# Patient Record
Sex: MALE | Race: BLACK OR AFRICAN AMERICAN | NOT HISPANIC OR LATINO | URBAN - METROPOLITAN AREA
[De-identification: names, ages, dates, MRNs, and addresses within clinical notes are randomized per-mention and may not be internally consistent; named-entity substitution may affect disease eponyms.]

---

## 2023-10-21 ENCOUNTER — INPATIENT (INPATIENT)
Facility: HOSPITAL | Age: 48
LOS: 5 days | Discharge: HOME | End: 2023-10-27
Attending: HOSPITALIST | Admitting: HOSPITALIST
Payer: COMMERCIAL

## 2023-10-21 VITALS
WEIGHT: 315 LBS | RESPIRATION RATE: 18 BRPM | SYSTOLIC BLOOD PRESSURE: 162 MMHG | TEMPERATURE: 102 F | DIASTOLIC BLOOD PRESSURE: 85 MMHG | OXYGEN SATURATION: 98 % | HEIGHT: 73 IN | HEART RATE: 135 BPM

## 2023-10-21 LAB
ALBUMIN SERPL ELPH-MCNC: 3.5 G/DL — SIGNIFICANT CHANGE UP (ref 3.3–5)
ALBUMIN SERPL ELPH-MCNC: 3.5 G/DL — SIGNIFICANT CHANGE UP (ref 3.3–5)
ALP SERPL-CCNC: 75 U/L — SIGNIFICANT CHANGE UP (ref 40–120)
ALP SERPL-CCNC: 75 U/L — SIGNIFICANT CHANGE UP (ref 40–120)
ALT FLD-CCNC: 45 U/L — SIGNIFICANT CHANGE UP (ref 12–78)
ALT FLD-CCNC: 45 U/L — SIGNIFICANT CHANGE UP (ref 12–78)
ANION GAP SERPL CALC-SCNC: 8 MMOL/L — SIGNIFICANT CHANGE UP (ref 5–17)
ANION GAP SERPL CALC-SCNC: 8 MMOL/L — SIGNIFICANT CHANGE UP (ref 5–17)
APPEARANCE UR: CLEAR — SIGNIFICANT CHANGE UP
APPEARANCE UR: CLEAR — SIGNIFICANT CHANGE UP
APTT BLD: 28.2 SEC — SIGNIFICANT CHANGE UP (ref 24.5–35.6)
APTT BLD: 28.2 SEC — SIGNIFICANT CHANGE UP (ref 24.5–35.6)
AST SERPL-CCNC: 17 U/L — SIGNIFICANT CHANGE UP (ref 15–37)
AST SERPL-CCNC: 17 U/L — SIGNIFICANT CHANGE UP (ref 15–37)
BASOPHILS # BLD AUTO: 0.04 K/UL — SIGNIFICANT CHANGE UP (ref 0–0.2)
BASOPHILS # BLD AUTO: 0.04 K/UL — SIGNIFICANT CHANGE UP (ref 0–0.2)
BASOPHILS NFR BLD AUTO: 0.2 % — SIGNIFICANT CHANGE UP (ref 0–2)
BASOPHILS NFR BLD AUTO: 0.2 % — SIGNIFICANT CHANGE UP (ref 0–2)
BILIRUB SERPL-MCNC: 0.8 MG/DL — SIGNIFICANT CHANGE UP (ref 0.2–1.2)
BILIRUB SERPL-MCNC: 0.8 MG/DL — SIGNIFICANT CHANGE UP (ref 0.2–1.2)
BILIRUB UR-MCNC: NEGATIVE — SIGNIFICANT CHANGE UP
BILIRUB UR-MCNC: NEGATIVE — SIGNIFICANT CHANGE UP
BUN SERPL-MCNC: 13 MG/DL — SIGNIFICANT CHANGE UP (ref 7–23)
BUN SERPL-MCNC: 13 MG/DL — SIGNIFICANT CHANGE UP (ref 7–23)
CALCIUM SERPL-MCNC: 8.7 MG/DL — SIGNIFICANT CHANGE UP (ref 8.5–10.1)
CALCIUM SERPL-MCNC: 8.7 MG/DL — SIGNIFICANT CHANGE UP (ref 8.5–10.1)
CHLORIDE SERPL-SCNC: 105 MMOL/L — SIGNIFICANT CHANGE UP (ref 96–108)
CHLORIDE SERPL-SCNC: 105 MMOL/L — SIGNIFICANT CHANGE UP (ref 96–108)
CK SERPL-CCNC: 166 U/L — SIGNIFICANT CHANGE UP (ref 26–308)
CK SERPL-CCNC: 166 U/L — SIGNIFICANT CHANGE UP (ref 26–308)
CO2 SERPL-SCNC: 27 MMOL/L — SIGNIFICANT CHANGE UP (ref 22–31)
CO2 SERPL-SCNC: 27 MMOL/L — SIGNIFICANT CHANGE UP (ref 22–31)
COLOR SPEC: YELLOW — SIGNIFICANT CHANGE UP
COLOR SPEC: YELLOW — SIGNIFICANT CHANGE UP
CREAT SERPL-MCNC: 0.98 MG/DL — SIGNIFICANT CHANGE UP (ref 0.5–1.3)
CREAT SERPL-MCNC: 0.98 MG/DL — SIGNIFICANT CHANGE UP (ref 0.5–1.3)
D DIMER BLD IA.RAPID-MCNC: 169 NG/ML DDU — SIGNIFICANT CHANGE UP
D DIMER BLD IA.RAPID-MCNC: 169 NG/ML DDU — SIGNIFICANT CHANGE UP
DIFF PNL FLD: NEGATIVE — SIGNIFICANT CHANGE UP
DIFF PNL FLD: NEGATIVE — SIGNIFICANT CHANGE UP
EGFR: 94 ML/MIN/1.73M2 — SIGNIFICANT CHANGE UP
EGFR: 94 ML/MIN/1.73M2 — SIGNIFICANT CHANGE UP
EOSINOPHIL # BLD AUTO: 0.02 K/UL — SIGNIFICANT CHANGE UP (ref 0–0.5)
EOSINOPHIL # BLD AUTO: 0.02 K/UL — SIGNIFICANT CHANGE UP (ref 0–0.5)
EOSINOPHIL NFR BLD AUTO: 0.1 % — SIGNIFICANT CHANGE UP (ref 0–6)
EOSINOPHIL NFR BLD AUTO: 0.1 % — SIGNIFICANT CHANGE UP (ref 0–6)
GLUCOSE SERPL-MCNC: 316 MG/DL — HIGH (ref 70–99)
GLUCOSE SERPL-MCNC: 316 MG/DL — HIGH (ref 70–99)
GLUCOSE UR QL: 1000 MG/DL
GLUCOSE UR QL: 1000 MG/DL
HCT VFR BLD CALC: 42.6 % — SIGNIFICANT CHANGE UP (ref 39–50)
HCT VFR BLD CALC: 42.6 % — SIGNIFICANT CHANGE UP (ref 39–50)
HGB BLD-MCNC: 13.3 G/DL — SIGNIFICANT CHANGE UP (ref 13–17)
HGB BLD-MCNC: 13.3 G/DL — SIGNIFICANT CHANGE UP (ref 13–17)
IMM GRANULOCYTES NFR BLD AUTO: 0.6 % — SIGNIFICANT CHANGE UP (ref 0–0.9)
IMM GRANULOCYTES NFR BLD AUTO: 0.6 % — SIGNIFICANT CHANGE UP (ref 0–0.9)
INR BLD: 1.01 RATIO — SIGNIFICANT CHANGE UP (ref 0.85–1.18)
INR BLD: 1.01 RATIO — SIGNIFICANT CHANGE UP (ref 0.85–1.18)
KETONES UR-MCNC: ABNORMAL
KETONES UR-MCNC: ABNORMAL
LACTATE SERPL-SCNC: 2.5 MMOL/L — HIGH (ref 0.7–2)
LACTATE SERPL-SCNC: 2.5 MMOL/L — HIGH (ref 0.7–2)
LACTATE SERPL-SCNC: 2.8 MMOL/L — HIGH (ref 0.7–2)
LACTATE SERPL-SCNC: 2.8 MMOL/L — HIGH (ref 0.7–2)
LEUKOCYTE ESTERASE UR-ACNC: NEGATIVE — SIGNIFICANT CHANGE UP
LEUKOCYTE ESTERASE UR-ACNC: NEGATIVE — SIGNIFICANT CHANGE UP
LYMPHOCYTES # BLD AUTO: 0.48 K/UL — LOW (ref 1–3.3)
LYMPHOCYTES # BLD AUTO: 0.48 K/UL — LOW (ref 1–3.3)
LYMPHOCYTES # BLD AUTO: 2.3 % — LOW (ref 13–44)
LYMPHOCYTES # BLD AUTO: 2.3 % — LOW (ref 13–44)
MCHC RBC-ENTMCNC: 26.4 PG — LOW (ref 27–34)
MCHC RBC-ENTMCNC: 26.4 PG — LOW (ref 27–34)
MCHC RBC-ENTMCNC: 31.2 G/DL — LOW (ref 32–36)
MCHC RBC-ENTMCNC: 31.2 G/DL — LOW (ref 32–36)
MCV RBC AUTO: 84.5 FL — SIGNIFICANT CHANGE UP (ref 80–100)
MCV RBC AUTO: 84.5 FL — SIGNIFICANT CHANGE UP (ref 80–100)
MONOCYTES # BLD AUTO: 1.13 K/UL — HIGH (ref 0–0.9)
MONOCYTES # BLD AUTO: 1.13 K/UL — HIGH (ref 0–0.9)
MONOCYTES NFR BLD AUTO: 5.4 % — SIGNIFICANT CHANGE UP (ref 2–14)
MONOCYTES NFR BLD AUTO: 5.4 % — SIGNIFICANT CHANGE UP (ref 2–14)
NEUTROPHILS # BLD AUTO: 19.31 K/UL — HIGH (ref 1.8–7.4)
NEUTROPHILS # BLD AUTO: 19.31 K/UL — HIGH (ref 1.8–7.4)
NEUTROPHILS NFR BLD AUTO: 91.4 % — HIGH (ref 43–77)
NEUTROPHILS NFR BLD AUTO: 91.4 % — HIGH (ref 43–77)
NITRITE UR-MCNC: NEGATIVE — SIGNIFICANT CHANGE UP
NITRITE UR-MCNC: NEGATIVE — SIGNIFICANT CHANGE UP
NRBC # BLD: 0 /100 WBCS — SIGNIFICANT CHANGE UP (ref 0–0)
NRBC # BLD: 0 /100 WBCS — SIGNIFICANT CHANGE UP (ref 0–0)
NT-PROBNP SERPL-SCNC: 29 PG/ML — SIGNIFICANT CHANGE UP (ref 0–125)
NT-PROBNP SERPL-SCNC: 29 PG/ML — SIGNIFICANT CHANGE UP (ref 0–125)
PH UR: 5 — SIGNIFICANT CHANGE UP (ref 5–8)
PH UR: 5 — SIGNIFICANT CHANGE UP (ref 5–8)
PLATELET # BLD AUTO: 290 K/UL — SIGNIFICANT CHANGE UP (ref 150–400)
PLATELET # BLD AUTO: 290 K/UL — SIGNIFICANT CHANGE UP (ref 150–400)
POTASSIUM SERPL-MCNC: 3.7 MMOL/L — SIGNIFICANT CHANGE UP (ref 3.5–5.3)
POTASSIUM SERPL-MCNC: 3.7 MMOL/L — SIGNIFICANT CHANGE UP (ref 3.5–5.3)
POTASSIUM SERPL-SCNC: 3.7 MMOL/L — SIGNIFICANT CHANGE UP (ref 3.5–5.3)
POTASSIUM SERPL-SCNC: 3.7 MMOL/L — SIGNIFICANT CHANGE UP (ref 3.5–5.3)
PROT SERPL-MCNC: 8.1 GM/DL — SIGNIFICANT CHANGE UP (ref 6–8.3)
PROT SERPL-MCNC: 8.1 GM/DL — SIGNIFICANT CHANGE UP (ref 6–8.3)
PROT UR-MCNC: 15 MG/DL
PROT UR-MCNC: 15 MG/DL
PROTHROM AB SERPL-ACNC: 12.1 SEC — SIGNIFICANT CHANGE UP (ref 9.5–13)
PROTHROM AB SERPL-ACNC: 12.1 SEC — SIGNIFICANT CHANGE UP (ref 9.5–13)
RAPID RVP RESULT: SIGNIFICANT CHANGE UP
RAPID RVP RESULT: SIGNIFICANT CHANGE UP
RBC # BLD: 5.04 M/UL — SIGNIFICANT CHANGE UP (ref 4.2–5.8)
RBC # BLD: 5.04 M/UL — SIGNIFICANT CHANGE UP (ref 4.2–5.8)
RBC # FLD: 13.6 % — SIGNIFICANT CHANGE UP (ref 10.3–14.5)
RBC # FLD: 13.6 % — SIGNIFICANT CHANGE UP (ref 10.3–14.5)
RBC CASTS # UR COMP ASSIST: SIGNIFICANT CHANGE UP /HPF (ref 0–4)
RBC CASTS # UR COMP ASSIST: SIGNIFICANT CHANGE UP /HPF (ref 0–4)
SARS-COV-2 RNA SPEC QL NAA+PROBE: SIGNIFICANT CHANGE UP
SARS-COV-2 RNA SPEC QL NAA+PROBE: SIGNIFICANT CHANGE UP
SODIUM SERPL-SCNC: 140 MMOL/L — SIGNIFICANT CHANGE UP (ref 135–145)
SODIUM SERPL-SCNC: 140 MMOL/L — SIGNIFICANT CHANGE UP (ref 135–145)
SP GR SPEC: 1.02 — SIGNIFICANT CHANGE UP (ref 1.01–1.02)
SP GR SPEC: 1.02 — SIGNIFICANT CHANGE UP (ref 1.01–1.02)
TROPONIN I, HIGH SENSITIVITY RESULT: 7 NG/L — SIGNIFICANT CHANGE UP
TROPONIN I, HIGH SENSITIVITY RESULT: 7 NG/L — SIGNIFICANT CHANGE UP
UROBILINOGEN FLD QL: NEGATIVE MG/DL — SIGNIFICANT CHANGE UP
UROBILINOGEN FLD QL: NEGATIVE MG/DL — SIGNIFICANT CHANGE UP
WBC # BLD: 21.11 K/UL — HIGH (ref 3.8–10.5)
WBC # BLD: 21.11 K/UL — HIGH (ref 3.8–10.5)
WBC # FLD AUTO: 21.11 K/UL — HIGH (ref 3.8–10.5)
WBC # FLD AUTO: 21.11 K/UL — HIGH (ref 3.8–10.5)
WBC UR QL: SIGNIFICANT CHANGE UP
WBC UR QL: SIGNIFICANT CHANGE UP

## 2023-10-21 PROCEDURE — 93010 ELECTROCARDIOGRAM REPORT: CPT

## 2023-10-21 PROCEDURE — 74177 CT ABD & PELVIS W/CONTRAST: CPT | Mod: 26,MA

## 2023-10-21 PROCEDURE — 71045 X-RAY EXAM CHEST 1 VIEW: CPT | Mod: 26

## 2023-10-21 PROCEDURE — 71260 CT THORAX DX C+: CPT | Mod: 26,MA

## 2023-10-21 PROCEDURE — 99285 EMERGENCY DEPT VISIT HI MDM: CPT

## 2023-10-21 RX ORDER — ACETAMINOPHEN 500 MG
1000 TABLET ORAL ONCE
Refills: 0 | Status: COMPLETED | OUTPATIENT
Start: 2023-10-21 | End: 2023-10-21

## 2023-10-21 RX ORDER — AZITHROMYCIN 500 MG/1
500 TABLET, FILM COATED ORAL ONCE
Refills: 0 | Status: COMPLETED | OUTPATIENT
Start: 2023-10-21 | End: 2023-10-21

## 2023-10-21 RX ORDER — SODIUM CHLORIDE 9 MG/ML
2500 INJECTION INTRAMUSCULAR; INTRAVENOUS; SUBCUTANEOUS ONCE
Refills: 0 | Status: COMPLETED | OUTPATIENT
Start: 2023-10-21 | End: 2023-10-21

## 2023-10-21 RX ORDER — CEFTRIAXONE 500 MG/1
1000 INJECTION, POWDER, FOR SOLUTION INTRAMUSCULAR; INTRAVENOUS ONCE
Refills: 0 | Status: COMPLETED | OUTPATIENT
Start: 2023-10-21 | End: 2023-10-21

## 2023-10-21 RX ORDER — IBUPROFEN 200 MG
600 TABLET ORAL ONCE
Refills: 0 | Status: COMPLETED | OUTPATIENT
Start: 2023-10-21 | End: 2023-10-21

## 2023-10-21 RX ADMIN — CEFTRIAXONE 100 MILLIGRAM(S): 500 INJECTION, POWDER, FOR SOLUTION INTRAMUSCULAR; INTRAVENOUS at 20:02

## 2023-10-21 RX ADMIN — SODIUM CHLORIDE 2500 MILLILITER(S): 9 INJECTION INTRAMUSCULAR; INTRAVENOUS; SUBCUTANEOUS at 19:45

## 2023-10-21 RX ADMIN — Medication 600 MILLIGRAM(S): at 23:51

## 2023-10-21 RX ADMIN — AZITHROMYCIN 255 MILLIGRAM(S): 500 TABLET, FILM COATED ORAL at 20:46

## 2023-10-21 RX ADMIN — Medication 400 MILLIGRAM(S): at 19:45

## 2023-10-21 RX ADMIN — Medication 100 MILLIGRAM(S): at 19:45

## 2023-10-21 NOTE — ED PROVIDER NOTE - ATTENDING APP SHARED VISIT CONTRIBUTION OF CARE
This patient is a 48 year old man hx of DM and HTN who presents to the ER with reports of generalized weakness, cough, and fever.  Patient states that he was in a good state prior to today however his wife at bedside does reports that around 12 pm he complained of mild back pain.  He did not eat much today and when they went out to eat around 3pm she noticed the patent appeared SOB.  Patient reports that he developed a non-productive cough at that time and was SOB but the SOB has improved.  He denies chest pain, sick contacts, recent travel, dysuria, hematuria, diarrhea, vomiting and URI symptoms.  He reports that he has been feeling weaker since the onset of symptoms and has never had these symptoms in the past.  Patient ill appearing on exam, tachycardic, lungs clear, abdomen soft, PERRL, CN 2-12 intact, no focal or sensory deficits.  Patient noted to be febrile code sepsis alert by me.  IVF and abx ordred  Likely suspected viral infection as well as bacteremia and pyelonephritis.  RVP resulted negative.  CT and UA negative for acute pathology.  Patient continued to appear ill but non-toxic and with generalized weakness reluctant to sit up in bed or stand.  Patient admitted to telemetry. This patient is a 48 year old man hx of DM and HTN who presents to the ER with reports of generalized weakness, cough, and fever.  Patient states that he was in a good state prior to today however his wife at bedside does reports that around 12 pm he complained of mild back pain.  He did not eat much today and when they went out to eat around 3pm she noticed the patent appeared SOB.  Patient reports that he developed a non-productive cough at that time and was SOB but the SOB has improved.  He denies chest pain, sick contacts, recent travel, dysuria, hematuria, diarrhea, vomiting and URI symptoms.  He reports that he has been feeling weaker since the onset of symptoms and has never had these symptoms in the past.  Patient ill appearing on exam, tachycardic, lungs clear, abdomen soft, PERRL, CN 2-12 intact, no focal or sensory deficits.  Patient noted to be febrile code sepsis alert by me.  IVF and abx ordred  Likely suspected viral infection as well as bacteremia and pyelonephritis.  RVP resulted negative.  Leukocytosis noted.  UA negative and CT shows no etiologic cause for sepsis.  Patient continued to appear ill but non-toxic and with generalized weakness reluctant to sit up in bed or stand.  Patient admitted to telemetry. This patient is a 48 year old man hx of DM and HTN who presents to the ER with reports of generalized weakness, cough, and fever.  Patient states that he was in a good state prior to today however his wife at bedside does reports that around 12 pm he complained of mild back pain.  He did not eat much today and when they went out to eat around 3pm she noticed the patent appeared SOB.  Patient reports that he developed a non-productive cough at that time and was SOB but the SOB has improved.  He denies chest pain, sick contacts, recent travel, dysuria, hematuria, diarrhea, vomiting, rash, abscess, sore throat and URI symptoms.  He reports that he has been feeling weaker since the onset of symptoms and has never had these symptoms in the past.  Patient ill appearing on exam, tachycardic, lungs clear, abdomen soft, PERRL, CN 2-12 intact, no focal or sensory deficits.  Patient noted to be febrile code sepsis alert.  IVF and abx ordered.  Likely suspected viral infection as well as bacteremia and pyelonephritis.  RVP resulted negative.  Leukocytosis noted.  UA negative and CT shows no etiologic cause for sepsis.  Patient continued to appear ill but non-toxic and with generalized weakness reluctant to sit up in bed or stand.  Patient admitted to telemetry.

## 2023-10-21 NOTE — ED ADULT NURSE NOTE - OBJECTIVE STATEMENT
Pt AOx4 and ambulatory with steady gait c/o cough, lower back pain, and difficulty breathing since this morning. Pt wife at bedside states she noticed pt was not feeling well since last night. Pt states cough is productive at times with clear sputum. Pt with uncontrolled cough noted. Pt denies N/V/D, HA/dizziness, abdominal pain, or dysuria. PMH HTN, DM.

## 2023-10-21 NOTE — ED PROVIDER NOTE - NS ED ATTENDING STATEMENT MOD
This was a shared visit with the RUBEN. I reviewed and verified the documentation and independently performed the documented:

## 2023-10-21 NOTE — ED PROVIDER NOTE - CLINICAL SUMMARY MEDICAL DECISION MAKING FREE TEXT BOX
49 y/o male with dm, htn here with coughing and fever today. Vs reviewed, pt is febrile 102 and tachycardic. Ddx include but not limited to viral illness, PNA.   Sepsis labs ordered, RVP, cxr r/o PNA, ivf, tylenol and tesssalon perles.

## 2023-10-21 NOTE — ED PROVIDER NOTE - NS ED ROS FT
CONSTITUTIONAL: no fatigue  EYES: No visual changes  ENT: No ear pain, no sore throat  CARDIOVASCULAR: No chest pain, no palpitations  RESPIRATORY:  no SOB  GI: No abdominal pain, no nausea, no vomiting, no constipation, no diarrhea  GENITOURINARY: No dysuria, no frequency, no hematuria  MUSKULOSKELETAL: No backpain, no joint pain, no myalgias  SKIN: No rash  NEURO: No headache    ALL OTHER SYSTEMS NEGATIVE.

## 2023-10-21 NOTE — ED PROVIDER NOTE - PHYSICAL EXAMINATION
GEN: Awake, alert, interactive, NAD. (+) Coughing  HEAD AND NECK: NC/AT. Airway patent. Neck supple.   EYES:  Clear b/l.    ENT: Moist mucus membranes.   CARDIAC: (+) tachycardic  No evident pedal edema.    RESP/CHEST: Normal respiratory effort with no use of accessory muscles or retractions. Clear throughout on auscultation.  ABD: soft, non-distended, non-tender. No rebound, no guarding.   BACK: No midline spinal TTP. No CVAT.   EXTREMITIES: Moving all extremities with no apparent deformities.   SKIN: Warm, dry, intact normal color. No rash.   NEURO: AOx3, CN II-XII grossly intact, no focal deficits.   PSYCH: Appropriate mood and affect.

## 2023-10-21 NOTE — ED ADULT NURSE NOTE - ED STAT RN HANDOFF DETAILS
Hand off report given to Fifi NASCIMENTO. Pt on cardiac monitor and pulse ox, pt in sinus tachycardia -071. Respirations spontaneous and unlabored. Pt in NAD at this time

## 2023-10-21 NOTE — ED PROVIDER NOTE - OBJECTIVE STATEMENT
49 y/o male with DM, htn here with fever and cough started today. Pt reports coughing today with clear phlegm. Pt denies any chest pain, dyspnea, recent travel, sick contact, nausea, vomiting, abdominal pain. Pt denies smoking, drinking drugs.

## 2023-10-21 NOTE — ED ADULT NURSE NOTE - NSFALLUNIVINTERV_ED_ALL_ED
Bed/Stretcher in lowest position, wheels locked, appropriate side rails in place/Call bell, personal items and telephone in reach/Instruct patient to call for assistance before getting out of bed/chair/stretcher/Non-slip footwear applied when patient is off stretcher/[place] to call system/Physically safe environment - no spills, clutter or unnecessary equipment/Purposeful proactive rounding/Room/bathroom lighting operational, light cord in reach

## 2023-10-22 DIAGNOSIS — R53.1 WEAKNESS: ICD-10-CM

## 2023-10-22 DIAGNOSIS — I10 ESSENTIAL (PRIMARY) HYPERTENSION: ICD-10-CM

## 2023-10-22 DIAGNOSIS — L03.90 CELLULITIS, UNSPECIFIED: ICD-10-CM

## 2023-10-22 DIAGNOSIS — E87.20 ACIDOSIS, UNSPECIFIED: ICD-10-CM

## 2023-10-22 DIAGNOSIS — E11.65 TYPE 2 DIABETES MELLITUS WITH HYPERGLYCEMIA: ICD-10-CM

## 2023-10-22 DIAGNOSIS — E78.5 HYPERLIPIDEMIA, UNSPECIFIED: ICD-10-CM

## 2023-10-22 LAB
A1C WITH ESTIMATED AVERAGE GLUCOSE RESULT: 10.6 % — HIGH (ref 4–5.6)
A1C WITH ESTIMATED AVERAGE GLUCOSE RESULT: 10.6 % — HIGH (ref 4–5.6)
ANION GAP SERPL CALC-SCNC: 7 MMOL/L — SIGNIFICANT CHANGE UP (ref 5–17)
ANION GAP SERPL CALC-SCNC: 7 MMOL/L — SIGNIFICANT CHANGE UP (ref 5–17)
BUN SERPL-MCNC: 9 MG/DL — SIGNIFICANT CHANGE UP (ref 7–23)
BUN SERPL-MCNC: 9 MG/DL — SIGNIFICANT CHANGE UP (ref 7–23)
CALCIUM SERPL-MCNC: 8.1 MG/DL — LOW (ref 8.5–10.1)
CALCIUM SERPL-MCNC: 8.1 MG/DL — LOW (ref 8.5–10.1)
CHLORIDE SERPL-SCNC: 111 MMOL/L — HIGH (ref 96–108)
CHLORIDE SERPL-SCNC: 111 MMOL/L — HIGH (ref 96–108)
CO2 SERPL-SCNC: 24 MMOL/L — SIGNIFICANT CHANGE UP (ref 22–31)
CO2 SERPL-SCNC: 24 MMOL/L — SIGNIFICANT CHANGE UP (ref 22–31)
CREAT SERPL-MCNC: 0.69 MG/DL — SIGNIFICANT CHANGE UP (ref 0.5–1.3)
CREAT SERPL-MCNC: 0.69 MG/DL — SIGNIFICANT CHANGE UP (ref 0.5–1.3)
EGFR: 113 ML/MIN/1.73M2 — SIGNIFICANT CHANGE UP
EGFR: 113 ML/MIN/1.73M2 — SIGNIFICANT CHANGE UP
ESTIMATED AVERAGE GLUCOSE: 258 MG/DL — HIGH (ref 68–114)
ESTIMATED AVERAGE GLUCOSE: 258 MG/DL — HIGH (ref 68–114)
GLUCOSE BLDC GLUCOMTR-MCNC: 196 MG/DL — HIGH (ref 70–99)
GLUCOSE BLDC GLUCOMTR-MCNC: 196 MG/DL — HIGH (ref 70–99)
GLUCOSE BLDC GLUCOMTR-MCNC: 215 MG/DL — HIGH (ref 70–99)
GLUCOSE BLDC GLUCOMTR-MCNC: 215 MG/DL — HIGH (ref 70–99)
GLUCOSE BLDC GLUCOMTR-MCNC: 220 MG/DL — HIGH (ref 70–99)
GLUCOSE BLDC GLUCOMTR-MCNC: 220 MG/DL — HIGH (ref 70–99)
GLUCOSE BLDC GLUCOMTR-MCNC: 287 MG/DL — HIGH (ref 70–99)
GLUCOSE BLDC GLUCOMTR-MCNC: 287 MG/DL — HIGH (ref 70–99)
GLUCOSE SERPL-MCNC: 202 MG/DL — HIGH (ref 70–99)
GLUCOSE SERPL-MCNC: 202 MG/DL — HIGH (ref 70–99)
GP B STREP DNA BLD POS QL NAA+NON-PROBE: SIGNIFICANT CHANGE UP
GP B STREP DNA BLD POS QL NAA+NON-PROBE: SIGNIFICANT CHANGE UP
GRAM STN FLD: SIGNIFICANT CHANGE UP
GRAM STN FLD: SIGNIFICANT CHANGE UP
HCT VFR BLD CALC: 37 % — LOW (ref 39–50)
HCT VFR BLD CALC: 37 % — LOW (ref 39–50)
HGB BLD-MCNC: 12.1 G/DL — LOW (ref 13–17)
HGB BLD-MCNC: 12.1 G/DL — LOW (ref 13–17)
LACTATE SERPL-SCNC: 1.3 MMOL/L — SIGNIFICANT CHANGE UP (ref 0.7–2)
LACTATE SERPL-SCNC: 1.3 MMOL/L — SIGNIFICANT CHANGE UP (ref 0.7–2)
MCHC RBC-ENTMCNC: 27.3 PG — SIGNIFICANT CHANGE UP (ref 27–34)
MCHC RBC-ENTMCNC: 27.3 PG — SIGNIFICANT CHANGE UP (ref 27–34)
MCHC RBC-ENTMCNC: 32.7 G/DL — SIGNIFICANT CHANGE UP (ref 32–36)
MCHC RBC-ENTMCNC: 32.7 G/DL — SIGNIFICANT CHANGE UP (ref 32–36)
MCV RBC AUTO: 83.5 FL — SIGNIFICANT CHANGE UP (ref 80–100)
MCV RBC AUTO: 83.5 FL — SIGNIFICANT CHANGE UP (ref 80–100)
METHOD TYPE: SIGNIFICANT CHANGE UP
METHOD TYPE: SIGNIFICANT CHANGE UP
MRSA PCR RESULT.: SIGNIFICANT CHANGE UP
MRSA PCR RESULT.: SIGNIFICANT CHANGE UP
NRBC # BLD: 0 /100 WBCS — SIGNIFICANT CHANGE UP (ref 0–0)
NRBC # BLD: 0 /100 WBCS — SIGNIFICANT CHANGE UP (ref 0–0)
PLATELET # BLD AUTO: 243 K/UL — SIGNIFICANT CHANGE UP (ref 150–400)
PLATELET # BLD AUTO: 243 K/UL — SIGNIFICANT CHANGE UP (ref 150–400)
POTASSIUM SERPL-MCNC: 3.1 MMOL/L — LOW (ref 3.5–5.3)
POTASSIUM SERPL-MCNC: 3.1 MMOL/L — LOW (ref 3.5–5.3)
POTASSIUM SERPL-SCNC: 3.1 MMOL/L — LOW (ref 3.5–5.3)
POTASSIUM SERPL-SCNC: 3.1 MMOL/L — LOW (ref 3.5–5.3)
PROCALCITONIN SERPL-MCNC: 6.02 NG/ML — HIGH (ref 0.02–0.1)
PROCALCITONIN SERPL-MCNC: 6.02 NG/ML — HIGH (ref 0.02–0.1)
RBC # BLD: 4.43 M/UL — SIGNIFICANT CHANGE UP (ref 4.2–5.8)
RBC # BLD: 4.43 M/UL — SIGNIFICANT CHANGE UP (ref 4.2–5.8)
RBC # FLD: 13.8 % — SIGNIFICANT CHANGE UP (ref 10.3–14.5)
RBC # FLD: 13.8 % — SIGNIFICANT CHANGE UP (ref 10.3–14.5)
S AUREUS DNA NOSE QL NAA+PROBE: SIGNIFICANT CHANGE UP
S AUREUS DNA NOSE QL NAA+PROBE: SIGNIFICANT CHANGE UP
SODIUM SERPL-SCNC: 142 MMOL/L — SIGNIFICANT CHANGE UP (ref 135–145)
SODIUM SERPL-SCNC: 142 MMOL/L — SIGNIFICANT CHANGE UP (ref 135–145)
SPECIMEN SOURCE: SIGNIFICANT CHANGE UP
SPECIMEN SOURCE: SIGNIFICANT CHANGE UP
WBC # BLD: 16.12 K/UL — HIGH (ref 3.8–10.5)
WBC # BLD: 16.12 K/UL — HIGH (ref 3.8–10.5)
WBC # FLD AUTO: 16.12 K/UL — HIGH (ref 3.8–10.5)
WBC # FLD AUTO: 16.12 K/UL — HIGH (ref 3.8–10.5)

## 2023-10-22 PROCEDURE — 73701 CT LOWER EXTREMITY W/DYE: CPT | Mod: 26,LT

## 2023-10-22 PROCEDURE — 93970 EXTREMITY STUDY: CPT | Mod: 26

## 2023-10-22 PROCEDURE — 93926 LOWER EXTREMITY STUDY: CPT | Mod: 26,LT

## 2023-10-22 PROCEDURE — 99222 1ST HOSP IP/OBS MODERATE 55: CPT

## 2023-10-22 RX ORDER — CEFAZOLIN SODIUM 1 G
1000 VIAL (EA) INJECTION EVERY 8 HOURS
Refills: 0 | Status: DISCONTINUED | OUTPATIENT
Start: 2023-10-22 | End: 2023-10-22

## 2023-10-22 RX ORDER — SODIUM CHLORIDE 9 MG/ML
1000 INJECTION, SOLUTION INTRAVENOUS
Refills: 0 | Status: DISCONTINUED | OUTPATIENT
Start: 2023-10-22 | End: 2023-10-27

## 2023-10-22 RX ORDER — CEFTRIAXONE 500 MG/1
2000 INJECTION, POWDER, FOR SOLUTION INTRAMUSCULAR; INTRAVENOUS EVERY 24 HOURS
Refills: 0 | Status: DISCONTINUED | OUTPATIENT
Start: 2023-10-22 | End: 2023-10-27

## 2023-10-22 RX ORDER — ASPIRIN/CALCIUM CARB/MAGNESIUM 324 MG
81 TABLET ORAL DAILY
Refills: 0 | Status: DISCONTINUED | OUTPATIENT
Start: 2023-10-22 | End: 2023-10-27

## 2023-10-22 RX ORDER — DEXTROSE 50 % IN WATER 50 %
25 SYRINGE (ML) INTRAVENOUS ONCE
Refills: 0 | Status: DISCONTINUED | OUTPATIENT
Start: 2023-10-22 | End: 2023-10-27

## 2023-10-22 RX ORDER — DEXTROSE 50 % IN WATER 50 %
12.5 SYRINGE (ML) INTRAVENOUS ONCE
Refills: 0 | Status: DISCONTINUED | OUTPATIENT
Start: 2023-10-22 | End: 2023-10-27

## 2023-10-22 RX ORDER — ACETAMINOPHEN 500 MG
1000 TABLET ORAL ONCE
Refills: 0 | Status: COMPLETED | OUTPATIENT
Start: 2023-10-22 | End: 2023-10-22

## 2023-10-22 RX ORDER — NIFEDIPINE 30 MG
1 TABLET, EXTENDED RELEASE 24 HR ORAL
Refills: 0 | DISCHARGE

## 2023-10-22 RX ORDER — GLUCAGON INJECTION, SOLUTION 0.5 MG/.1ML
1 INJECTION, SOLUTION SUBCUTANEOUS ONCE
Refills: 0 | Status: DISCONTINUED | OUTPATIENT
Start: 2023-10-22 | End: 2023-10-27

## 2023-10-22 RX ORDER — CEFAZOLIN SODIUM 1 G
1000 VIAL (EA) INJECTION ONCE
Refills: 0 | Status: COMPLETED | OUTPATIENT
Start: 2023-10-22 | End: 2023-10-22

## 2023-10-22 RX ORDER — NIFEDIPINE 30 MG
60 TABLET, EXTENDED RELEASE 24 HR ORAL DAILY
Refills: 0 | Status: DISCONTINUED | OUTPATIENT
Start: 2023-10-22 | End: 2023-10-27

## 2023-10-22 RX ORDER — ACARBOSE 25 MG/1
1 TABLET ORAL
Refills: 0 | DISCHARGE

## 2023-10-22 RX ORDER — TAMSULOSIN HYDROCHLORIDE 0.4 MG/1
0.4 CAPSULE ORAL AT BEDTIME
Refills: 0 | Status: DISCONTINUED | OUTPATIENT
Start: 2023-10-22 | End: 2023-10-27

## 2023-10-22 RX ORDER — CEFAZOLIN SODIUM 1 G
VIAL (EA) INJECTION
Refills: 0 | Status: DISCONTINUED | OUTPATIENT
Start: 2023-10-22 | End: 2023-10-22

## 2023-10-22 RX ORDER — DULAGLUTIDE 4.5 MG/.5ML
1.5 INJECTION, SOLUTION SUBCUTANEOUS
Refills: 0 | DISCHARGE

## 2023-10-22 RX ORDER — ENOXAPARIN SODIUM 100 MG/ML
40 INJECTION SUBCUTANEOUS EVERY 12 HOURS
Refills: 0 | Status: DISCONTINUED | OUTPATIENT
Start: 2023-10-22 | End: 2023-10-27

## 2023-10-22 RX ORDER — SIMVASTATIN 20 MG/1
1 TABLET, FILM COATED ORAL
Refills: 0 | DISCHARGE

## 2023-10-22 RX ORDER — ACETAMINOPHEN 500 MG
650 TABLET ORAL EVERY 6 HOURS
Refills: 0 | Status: DISCONTINUED | OUTPATIENT
Start: 2023-10-22 | End: 2023-10-27

## 2023-10-22 RX ORDER — DEXTROSE 50 % IN WATER 50 %
15 SYRINGE (ML) INTRAVENOUS ONCE
Refills: 0 | Status: DISCONTINUED | OUTPATIENT
Start: 2023-10-22 | End: 2023-10-27

## 2023-10-22 RX ORDER — ATORVASTATIN CALCIUM 80 MG/1
40 TABLET, FILM COATED ORAL AT BEDTIME
Refills: 0 | Status: DISCONTINUED | OUTPATIENT
Start: 2023-10-22 | End: 2023-10-27

## 2023-10-22 RX ORDER — LANOLIN ALCOHOL/MO/W.PET/CERES
3 CREAM (GRAM) TOPICAL AT BEDTIME
Refills: 0 | Status: DISCONTINUED | OUTPATIENT
Start: 2023-10-22 | End: 2023-10-27

## 2023-10-22 RX ORDER — VANCOMYCIN HCL 1 G
1000 VIAL (EA) INTRAVENOUS ONCE
Refills: 0 | Status: COMPLETED | OUTPATIENT
Start: 2023-10-22 | End: 2023-10-22

## 2023-10-22 RX ORDER — ASPIRIN/CALCIUM CARB/MAGNESIUM 324 MG
1 TABLET ORAL
Refills: 0 | DISCHARGE

## 2023-10-22 RX ORDER — METFORMIN HYDROCHLORIDE 850 MG/1
1 TABLET ORAL
Refills: 0 | DISCHARGE

## 2023-10-22 RX ORDER — SODIUM CHLORIDE 9 MG/ML
1000 INJECTION INTRAMUSCULAR; INTRAVENOUS; SUBCUTANEOUS ONCE
Refills: 0 | Status: COMPLETED | OUTPATIENT
Start: 2023-10-22 | End: 2023-10-22

## 2023-10-22 RX ORDER — INSULIN LISPRO 100/ML
VIAL (ML) SUBCUTANEOUS
Refills: 0 | Status: DISCONTINUED | OUTPATIENT
Start: 2023-10-22 | End: 2023-10-27

## 2023-10-22 RX ORDER — INFLUENZA VIRUS VACCINE 15; 15; 15; 15 UG/.5ML; UG/.5ML; UG/.5ML; UG/.5ML
0.5 SUSPENSION INTRAMUSCULAR ONCE
Refills: 0 | Status: DISCONTINUED | OUTPATIENT
Start: 2023-10-22 | End: 2023-10-27

## 2023-10-22 RX ORDER — ONDANSETRON 8 MG/1
4 TABLET, FILM COATED ORAL EVERY 8 HOURS
Refills: 0 | Status: DISCONTINUED | OUTPATIENT
Start: 2023-10-22 | End: 2023-10-27

## 2023-10-22 RX ORDER — LISINOPRIL 2.5 MG/1
40 TABLET ORAL DAILY
Refills: 0 | Status: DISCONTINUED | OUTPATIENT
Start: 2023-10-22 | End: 2023-10-27

## 2023-10-22 RX ORDER — LISINOPRIL 2.5 MG/1
1 TABLET ORAL
Refills: 0 | DISCHARGE

## 2023-10-22 RX ADMIN — Medication 400 MILLIGRAM(S): at 23:40

## 2023-10-22 RX ADMIN — SODIUM CHLORIDE 1000 MILLILITER(S): 9 INJECTION INTRAMUSCULAR; INTRAVENOUS; SUBCUTANEOUS at 01:09

## 2023-10-22 RX ADMIN — ENOXAPARIN SODIUM 40 MILLIGRAM(S): 100 INJECTION SUBCUTANEOUS at 06:08

## 2023-10-22 RX ADMIN — TAMSULOSIN HYDROCHLORIDE 0.4 MILLIGRAM(S): 0.4 CAPSULE ORAL at 21:53

## 2023-10-22 RX ADMIN — LISINOPRIL 40 MILLIGRAM(S): 2.5 TABLET ORAL at 06:02

## 2023-10-22 RX ADMIN — Medication 110 MILLIGRAM(S): at 06:03

## 2023-10-22 RX ADMIN — Medication 100 MILLIGRAM(S): at 06:02

## 2023-10-22 RX ADMIN — Medication 60 MILLIGRAM(S): at 06:03

## 2023-10-22 RX ADMIN — Medication 100 MILLIGRAM(S): at 12:29

## 2023-10-22 RX ADMIN — Medication 250 MILLIGRAM(S): at 16:09

## 2023-10-22 RX ADMIN — ATORVASTATIN CALCIUM 40 MILLIGRAM(S): 80 TABLET, FILM COATED ORAL at 21:53

## 2023-10-22 RX ADMIN — Medication 650 MILLIGRAM(S): at 12:34

## 2023-10-22 RX ADMIN — CEFTRIAXONE 100 MILLIGRAM(S): 500 INJECTION, POWDER, FOR SOLUTION INTRAMUSCULAR; INTRAVENOUS at 17:13

## 2023-10-22 RX ADMIN — Medication 100 MILLIGRAM(S): at 12:49

## 2023-10-22 RX ADMIN — Medication 100 MILLIGRAM(S): at 21:52

## 2023-10-22 RX ADMIN — ENOXAPARIN SODIUM 40 MILLIGRAM(S): 100 INJECTION SUBCUTANEOUS at 17:17

## 2023-10-22 RX ADMIN — Medication 100 MILLIGRAM(S): at 21:53

## 2023-10-22 RX ADMIN — Medication 100 MILLIGRAM(S): at 13:59

## 2023-10-22 RX ADMIN — Medication 600 MILLIGRAM(S): at 08:04

## 2023-10-22 RX ADMIN — Medication 650 MILLIGRAM(S): at 11:31

## 2023-10-22 RX ADMIN — Medication 81 MILLIGRAM(S): at 11:22

## 2023-10-22 RX ADMIN — TAMSULOSIN HYDROCHLORIDE 0.4 MILLIGRAM(S): 0.4 CAPSULE ORAL at 01:09

## 2023-10-22 NOTE — PATIENT PROFILE ADULT - HAS THE PATIENT RECEIVED THE INFLUENZA VACCINE THIS SEASON?
Routing refill request to provider for review/approval because:  Failing last bp    Radha Chino, RN           no...

## 2023-10-22 NOTE — H&P ADULT - ASSESSMENT
Marshall Harding is a 48 year old male with PMHx of HTN, HLD, and NIDDM2 who presented to the ED on 10/21/23 for complaints of cough and admitted for sepsis secondary to suspected LLE cellulitis and ?encephalitis.    Sepsis secondary to suspected LLE cellulitis  Differential for sepsis also includes possibly ?viral encephalitis  Complaints of cough x 3 days, associated headache  Reports recent travel to [place], admits to mosquito bites while there, denies retro-orbital pain  No meningeal signs such as photophobia, nuchal rigidity, nausea, or vomiting  No altered mental status  Physical exam with LLE erythematous, tender to palpation, warm to touch, LLE slightly larger than RLE  Tmax 103.1, , RR 25, WBC 21.11K on admission  U/A unremarkable for infection, RVP negative  CT A/P without signs of infection  CT chest performed, pending official read - as per ER physician, radiologist informed her it was unremarkable  S/p 3.5L NS bolus, ceftriaxone, and azithromycin in the ED  Started on doxycycline  F/u blood cultures, urine culture, procal, MRSA/MSSA PCR  F/u b/l LE venous doppler to r/o DVT  Monitor fever curve and WBC trend  Telemetry  Consider neurology consult if develops AMS    Lactic acidosis secondary to above  Lactic acid 2.8 on admission  S/p 3.5L NS bolus in the ED  F/u serial lactates    Generalized weakness, suspect due to infectious etiology  PT/OT ordered      Chronic medical conditions:  HTN: PTA lisinopril, nifedipine  HLD: PTA simvastatin  NIDDM2 with hyperglycemia: POC qac and qhs, SSI, PTA metformin, trulicity, acarbose, glipizide held, blood glucose goal < 180, f/u A1c    Plan of care discussed with wife at bedside.

## 2023-10-22 NOTE — PATIENT PROFILE ADULT - FALL HARM RISK - RISK INTERVENTIONS

## 2023-10-22 NOTE — H&P ADULT - NSHPPHYSICALEXAM_GEN_ALL_CORE
T(C): 37.9 (10-22-23 @ 02:09), Max: 39.5 (10-21-23 @ 20:51)  HR: 112 (10-22-23 @ 02:09) (112 - 138)  BP: 168/76 (10-22-23 @ 02:09) (139/88 - 181/69)  RR: 25 (10-22-23 @ 02:09) (18 - 25)  SpO2: 94% (10-22-23 @ 02:09) (94% - 98%)    CONSTITUTIONAL: Well groomed, obese  EYES: PERRLA and symmetric, EOMI  ENMT: Oral mucosa with moist membranes  RESP: No respiratory distress, no use of accessory muscles; CTA b/l  CV: tachycardic  GI: NT  SKIN: LLE erythematous, tender to palpation, warm to touch, LLE slightly larger than RLE

## 2023-10-22 NOTE — H&P ADULT - NSHPLABSRESULTS_GEN_ALL_CORE
13.3   . )-----------( 290      ( 21 Oct 2023 18:35 )             42.6   10-21    140  |  105  |  13  ----------------------------<  316<H>  3.7   |  27  |  0.98    Ca    8.7      21 Oct 2023 18:35    TPro  8.1  /  Alb  3.5  /  TBili  0.8  /  DBili  x   /  AST  17  /  ALT  45  /  AlkPhos  75  10-21    Urinalysis Basic - ( 21 Oct 2023 20:00 )    Color: Yellow / Appearance: Clear / S.020 / pH: x  Gluc: x / Ketone: Moderate  / Bili: Negative / Urobili: Negative mg/dL   Blood: x / Protein: 15 mg/dL / Nitrite: Negative   Leuk Esterase: Negative / RBC: 0-2 /HPF / WBC 0-2   Sq Epi: x / Non Sq Epi: x / Bacteria: x    CT A/P with IV contrast 10/21/23  IMPRESSION:  1.   Motion artifact limits this study.  2.   No evidence of focal consolidation.

## 2023-10-22 NOTE — H&P ADULT - HISTORY OF PRESENT ILLNESS
Marshall Harding is a 48 year old male with PMHx of HTN, HLD, and NIDDM2 who presented to the ED on 10/21/23 for complaints of cough.    Patient reports he has been having cough for the past three days. Sometimes, it is with clear sputum and other times, it is green/yellow in color. Tried taking Robitussin with mild alleviation of cough. Denies sick contacts. Recent travel to [place] and states he got bitten by mosquitoes while there. Associated headache and left leg tenderness. No trauma to the left leg. Denies neck pain, chest pain, shortness of breath, abdominal pain, diarrhea, or constipation. Of note, since he has not felt well for the past few days, he did not take any of his diabetic medications.    In the ED, Tmax 103.1, HR as elevated as 138, RR as elevated as 25. WBC 21.11K, blood glucose 316. Lactic acid 2.8. RVP negative. U/A with ketonuria, glucosuria. CT A/P without signs of infection. CT chest performed, official read not in but as per ER physician, Dr. Trores, she states she spoke to radiologist who states CT chest unremarkable. Received 3.5L NS bolus, ceftriaxone, and azithromycin.

## 2023-10-23 LAB
ANION GAP SERPL CALC-SCNC: 7 MMOL/L — SIGNIFICANT CHANGE UP (ref 5–17)
ANION GAP SERPL CALC-SCNC: 7 MMOL/L — SIGNIFICANT CHANGE UP (ref 5–17)
B BURGDOR C6 AB SER-ACNC: NEGATIVE — SIGNIFICANT CHANGE UP
B BURGDOR C6 AB SER-ACNC: NEGATIVE — SIGNIFICANT CHANGE UP
B BURGDOR IGG+IGM SER-ACNC: 0.1 INDEX — SIGNIFICANT CHANGE UP (ref 0.01–0.89)
B BURGDOR IGG+IGM SER-ACNC: 0.1 INDEX — SIGNIFICANT CHANGE UP (ref 0.01–0.89)
BABESIA MICROTI PCR, BLD RESULT: SIGNIFICANT CHANGE UP
BABESIA MICROTI PCR, BLD RESULT: SIGNIFICANT CHANGE UP
BUN SERPL-MCNC: 10 MG/DL — SIGNIFICANT CHANGE UP (ref 7–23)
BUN SERPL-MCNC: 10 MG/DL — SIGNIFICANT CHANGE UP (ref 7–23)
CALCIUM SERPL-MCNC: 8.8 MG/DL — SIGNIFICANT CHANGE UP (ref 8.5–10.1)
CALCIUM SERPL-MCNC: 8.8 MG/DL — SIGNIFICANT CHANGE UP (ref 8.5–10.1)
CHLORIDE SERPL-SCNC: 105 MMOL/L — SIGNIFICANT CHANGE UP (ref 96–108)
CHLORIDE SERPL-SCNC: 105 MMOL/L — SIGNIFICANT CHANGE UP (ref 96–108)
CO2 SERPL-SCNC: 25 MMOL/L — SIGNIFICANT CHANGE UP (ref 22–31)
CO2 SERPL-SCNC: 25 MMOL/L — SIGNIFICANT CHANGE UP (ref 22–31)
CREAT SERPL-MCNC: 0.77 MG/DL — SIGNIFICANT CHANGE UP (ref 0.5–1.3)
CREAT SERPL-MCNC: 0.77 MG/DL — SIGNIFICANT CHANGE UP (ref 0.5–1.3)
CULTURE RESULTS: SIGNIFICANT CHANGE UP
CULTURE RESULTS: SIGNIFICANT CHANGE UP
EGFR: 109 ML/MIN/1.73M2 — SIGNIFICANT CHANGE UP
EGFR: 109 ML/MIN/1.73M2 — SIGNIFICANT CHANGE UP
GLUCOSE BLDC GLUCOMTR-MCNC: 169 MG/DL — HIGH (ref 70–99)
GLUCOSE BLDC GLUCOMTR-MCNC: 169 MG/DL — HIGH (ref 70–99)
GLUCOSE BLDC GLUCOMTR-MCNC: 236 MG/DL — HIGH (ref 70–99)
GLUCOSE BLDC GLUCOMTR-MCNC: 236 MG/DL — HIGH (ref 70–99)
GLUCOSE BLDC GLUCOMTR-MCNC: 242 MG/DL — HIGH (ref 70–99)
GLUCOSE BLDC GLUCOMTR-MCNC: 242 MG/DL — HIGH (ref 70–99)
GLUCOSE BLDC GLUCOMTR-MCNC: 305 MG/DL — HIGH (ref 70–99)
GLUCOSE BLDC GLUCOMTR-MCNC: 305 MG/DL — HIGH (ref 70–99)
GLUCOSE SERPL-MCNC: 331 MG/DL — HIGH (ref 70–99)
GLUCOSE SERPL-MCNC: 331 MG/DL — HIGH (ref 70–99)
HCT VFR BLD CALC: 40.3 % — SIGNIFICANT CHANGE UP (ref 39–50)
HCT VFR BLD CALC: 40.3 % — SIGNIFICANT CHANGE UP (ref 39–50)
HGB BLD-MCNC: 13.2 G/DL — SIGNIFICANT CHANGE UP (ref 13–17)
HGB BLD-MCNC: 13.2 G/DL — SIGNIFICANT CHANGE UP (ref 13–17)
LACTATE SERPL-SCNC: 1 MMOL/L — SIGNIFICANT CHANGE UP (ref 0.7–2)
LACTATE SERPL-SCNC: 1 MMOL/L — SIGNIFICANT CHANGE UP (ref 0.7–2)
MCHC RBC-ENTMCNC: 27 PG — SIGNIFICANT CHANGE UP (ref 27–34)
MCHC RBC-ENTMCNC: 27 PG — SIGNIFICANT CHANGE UP (ref 27–34)
MCHC RBC-ENTMCNC: 32.8 G/DL — SIGNIFICANT CHANGE UP (ref 32–36)
MCHC RBC-ENTMCNC: 32.8 G/DL — SIGNIFICANT CHANGE UP (ref 32–36)
MCV RBC AUTO: 82.4 FL — SIGNIFICANT CHANGE UP (ref 80–100)
MCV RBC AUTO: 82.4 FL — SIGNIFICANT CHANGE UP (ref 80–100)
NRBC # BLD: 0 /100 WBCS — SIGNIFICANT CHANGE UP (ref 0–0)
NRBC # BLD: 0 /100 WBCS — SIGNIFICANT CHANGE UP (ref 0–0)
PLATELET # BLD AUTO: 249 K/UL — SIGNIFICANT CHANGE UP (ref 150–400)
PLATELET # BLD AUTO: 249 K/UL — SIGNIFICANT CHANGE UP (ref 150–400)
POTASSIUM SERPL-MCNC: 3.2 MMOL/L — LOW (ref 3.5–5.3)
POTASSIUM SERPL-MCNC: 3.2 MMOL/L — LOW (ref 3.5–5.3)
POTASSIUM SERPL-SCNC: 3.2 MMOL/L — LOW (ref 3.5–5.3)
POTASSIUM SERPL-SCNC: 3.2 MMOL/L — LOW (ref 3.5–5.3)
RBC # BLD: 4.89 M/UL — SIGNIFICANT CHANGE UP (ref 4.2–5.8)
RBC # BLD: 4.89 M/UL — SIGNIFICANT CHANGE UP (ref 4.2–5.8)
RBC # FLD: 13.8 % — SIGNIFICANT CHANGE UP (ref 10.3–14.5)
RBC # FLD: 13.8 % — SIGNIFICANT CHANGE UP (ref 10.3–14.5)
SODIUM SERPL-SCNC: 137 MMOL/L — SIGNIFICANT CHANGE UP (ref 135–145)
SODIUM SERPL-SCNC: 137 MMOL/L — SIGNIFICANT CHANGE UP (ref 135–145)
SPECIMEN SOURCE: SIGNIFICANT CHANGE UP
SPECIMEN SOURCE: SIGNIFICANT CHANGE UP
WBC # BLD: 9.96 K/UL — SIGNIFICANT CHANGE UP (ref 3.8–10.5)
WBC # BLD: 9.96 K/UL — SIGNIFICANT CHANGE UP (ref 3.8–10.5)
WBC # FLD AUTO: 9.96 K/UL — SIGNIFICANT CHANGE UP (ref 3.8–10.5)
WBC # FLD AUTO: 9.96 K/UL — SIGNIFICANT CHANGE UP (ref 3.8–10.5)

## 2023-10-23 PROCEDURE — 99232 SBSQ HOSP IP/OBS MODERATE 35: CPT

## 2023-10-23 PROCEDURE — 99254 IP/OBS CNSLTJ NEW/EST MOD 60: CPT

## 2023-10-23 RX ORDER — NIFEDIPINE 30 MG
30 TABLET, EXTENDED RELEASE 24 HR ORAL ONCE
Refills: 0 | Status: COMPLETED | OUTPATIENT
Start: 2023-10-23 | End: 2023-10-23

## 2023-10-23 RX ORDER — TRAMADOL HYDROCHLORIDE 50 MG/1
25 TABLET ORAL ONCE
Refills: 0 | Status: DISCONTINUED | OUTPATIENT
Start: 2023-10-23 | End: 2023-10-27

## 2023-10-23 RX ORDER — POTASSIUM CHLORIDE 20 MEQ
40 PACKET (EA) ORAL ONCE
Refills: 0 | Status: COMPLETED | OUTPATIENT
Start: 2023-10-23 | End: 2023-10-23

## 2023-10-23 RX ADMIN — Medication 40 MILLIEQUIVALENT(S): at 19:03

## 2023-10-23 RX ADMIN — Medication 81 MILLIGRAM(S): at 12:09

## 2023-10-23 RX ADMIN — Medication 100 MILLIGRAM(S): at 05:53

## 2023-10-23 RX ADMIN — CEFTRIAXONE 100 MILLIGRAM(S): 500 INJECTION, POWDER, FOR SOLUTION INTRAMUSCULAR; INTRAVENOUS at 14:56

## 2023-10-23 RX ADMIN — Medication 100 MILLIGRAM(S): at 14:56

## 2023-10-23 RX ADMIN — Medication 100 MILLIGRAM(S): at 21:15

## 2023-10-23 RX ADMIN — LISINOPRIL 40 MILLIGRAM(S): 2.5 TABLET ORAL at 05:52

## 2023-10-23 RX ADMIN — ENOXAPARIN SODIUM 40 MILLIGRAM(S): 100 INJECTION SUBCUTANEOUS at 05:52

## 2023-10-23 RX ADMIN — TAMSULOSIN HYDROCHLORIDE 0.4 MILLIGRAM(S): 0.4 CAPSULE ORAL at 21:15

## 2023-10-23 RX ADMIN — ENOXAPARIN SODIUM 40 MILLIGRAM(S): 100 INJECTION SUBCUTANEOUS at 17:21

## 2023-10-23 RX ADMIN — Medication 60 MILLIGRAM(S): at 05:53

## 2023-10-23 RX ADMIN — Medication 30 MILLIGRAM(S): at 11:03

## 2023-10-23 RX ADMIN — Medication 1000 MILLIGRAM(S): at 08:13

## 2023-10-23 RX ADMIN — ATORVASTATIN CALCIUM 40 MILLIGRAM(S): 80 TABLET, FILM COATED ORAL at 21:15

## 2023-10-23 RX ADMIN — Medication 1000 MILLIGRAM(S): at 00:50

## 2023-10-23 NOTE — OCCUPATIONAL THERAPY INITIAL EVALUATION ADULT - ADDITIONAL COMMENTS
Pt lives with spouse in an apartment with 5-6 steps to enter with a R handrail. Once inside, the pt has 3 flights of steps with a R handrail to reach the main floor where the apartment is. The pt bathroom has a tub/shower combination, regular toilet seat, fixed shower head and no grab bars. The pt ambulates with no device and does not own any device for ambulation.

## 2023-10-23 NOTE — PHYSICAL THERAPY INITIAL EVALUATION ADULT - ADDITIONAL COMMENTS
pt encountered in bed supine, aaox4, pt indep in bed mob and transfers, pt able to amb w/o AD ad-lid and able to negotiate 1 flight of stairs with no HR and reciprocating gait. no balance or gait deficit noted at this time. no skilled P.T need.

## 2023-10-23 NOTE — PHYSICAL THERAPY INITIAL EVALUATION ADULT - PERTINENT HX OF CURRENT PROBLEM, REHAB EVAL
pt admitted for cough and sepsis, presents with cellulitis of LLE with redness and edema. warmth and pain on palpation.

## 2023-10-23 NOTE — OCCUPATIONAL THERAPY INITIAL EVALUATION ADULT - STRENGTHENING, PT EVAL
Pt will increase left lower extremity strength to 5/5 to improve functional strength needed to engage in functional tasks by 2 weeks

## 2023-10-23 NOTE — OCCUPATIONAL THERAPY INITIAL EVALUATION ADULT - GENERAL OBSERVATIONS, REHAB EVAL
Pt was encountered supine in bed with pts spouse present; NAD, portable telemetry +, alert, verbalized name and , followed commands, cooperative; pt c/o pain in LLE which impacts pts ability to perform functional tasks/transfers and mobility. PT Du present.

## 2023-10-23 NOTE — CONSULT NOTE ADULT - SUBJECTIVE AND OBJECTIVE BOX
[facility]  INFECTIOUS DISEASES   [address] [place] [address]  Tel: [phone]     Fax: [phone]  ==============================================================================  DO Ivone Benitez MD Alexandra Gutman, NP   ==============================================================================    MARSHALL WHITLEY  MRN-[id number]  Male  48y ([birth year])        Patient is a 48y old  Male who presents with a chief complaint of Sepsis secondary to suspected LLE cellulitis (23 Oct 2023 09:53)      HPI:  Marshall Whitley is a 48 year old male with PMHx of HTN, HLD, and NIDDM2 who presented to the ED on 10/21/23 for complaints of cough.    Patient reports he has been having cough for the past three days. Sometimes, it is with clear sputum and other times, it is green/yellow in color. Tried taking Robitussin with mild alleviation of cough. Denies sick contacts. Recent travel to [place] and states he got bitten by mosquitoes while there. Associated headache and left leg tenderness. No trauma to the left leg. Denies neck pain, chest pain, shortness of breath, abdominal pain, diarrhea, or constipation. Of note, since he has not felt well for the past few days, he did not take any of his diabetic medications.    In the ED, Tmax 103.1, HR as elevated as 138, RR as elevated as 25. WBC 21.11K, blood glucose 316. Lactic acid 2.8. RVP negative. U/A with ketonuria, glucosuria. CT A/P without signs of infection. CT chest performed, official read not in but as per ER physician, Dr. Ambrose, she states she spoke to radiologist who states CT chest unremarkable. Received 3.5L NS bolus, ceftriaxone, and azithromycin.  (22 Oct 2023 01:58)      ID consulted for workup and antibiotic management     PAST MEDICAL & SURGICAL HISTORY:  Diabetes mellitus      Hypertension      High cholesterol          Allergies  No Known Allergies        ANTIMICROBIALS:  cefTRIAXone   IVPB 2000 every 24 hours      MEDICATIONS  (STANDING):  azithromycin  IVPB   255 mL/Hr IV Intermittent (10-21-23 @ 20:46)    ceFAZolin   IVPB   100 mL/Hr IV Intermittent (10-22-23 @ 12:49)    cefTRIAXone   IVPB   100 mL/Hr IV Intermittent (10-23-23 @ 14:56)   100 mL/Hr IV Intermittent (10-22-23 @ 17:13)    cefTRIAXone   IVPB   100 mL/Hr IV Intermittent (10-21-23 @ 20:02)    doxycycline IVPB   110 mL/Hr IV Intermittent (10-22-23 @ 06:03)    doxycycline monohydrate Capsule   100 milliGRAM(s) Oral (10-23-23 @ 05:53)   100 milliGRAM(s) Oral (10-22-23 @ 21:52)   100 milliGRAM(s) Oral (10-22-23 @ 12:29)    vancomycin  IVPB   250 mL/Hr IV Intermittent (10-22-23 @ 16:09)        OTHER MEDS: MEDICATIONS  (STANDING):  acetaminophen     Tablet .. 650 every 6 hours PRN  aluminum hydroxide/magnesium hydroxide/simethicone Suspension 30 every 4 hours PRN  aspirin  chewable 81 daily  atorvastatin 40 at bedtime  benzonatate 100 every 8 hours  dextrose 50% Injectable 12.5 once  dextrose 50% Injectable 25 once  dextrose 50% Injectable 25 once  dextrose Oral Gel 15 once PRN  enoxaparin Injectable 40 every 12 hours  glucagon  Injectable 1 once  influenza   Vaccine 0.5 once  insulin lispro (ADMELOG) corrective regimen sliding scale  three times a day before meals  lisinopril 40 daily  melatonin 3 at bedtime PRN  NIFEdipine XL 60 daily  ondansetron Injectable 4 every 8 hours PRN  tamsulosin 0.4 at bedtime  traMADol 25 once      SOCIAL HISTORY:     Smoking Cigarettes [ ]Active [ ] Former [x ]Denies   ETOH [x ]denies [ ]Former [ ]Current Use denies   Drug Use [x ]Never [ ] Former [ ] Active     FAMILY HISTORY:  FH Diabetes mellitus     REVIEW OF SYSTEMS  [  ] ROS unobtainable because:    [X  ] All other systems negative except as noted below:	    Constitutional:  [X ] fever [ X] chills  [ ] weight loss  [ ] weakness  Skin:  [ ] rash [ ] phlebitis	  Eyes: [ ] icterus [ ] pain  [ ] discharge	  ENMT: [ ] sore throat  [ ] thrush [ ] ulcers [ ] exudates  Respiratory: [ ] dyspnea [ ] hemoptysis [ ] cough [ ] sputum	  Cardiovascular:  [ ] chest pain [ ] palpitations [x ] edema	  Gastrointestinal:  [ ] nausea [ ] vomiting [ ] diarrhea [ ] constipation [ ] pain	  Genitourinary:  [ ] dysuria [ ] frequency [ ] hematuria [ ] discharge [ ] flank pain  [ ] incontinence  Musculoskeletal:  [ ] myalgias [ ] arthralgias [ ] arthritis  [ ] back pain  Neurological:  [ ] headache [ ] seizures  [ ] confusion/altered mental status  Psychiatric:  [ ] anxiety [ ] depression	  Hematology/Lymphatics:  [ ] lymphadenopathy  Endocrine:  [ ] adrenal [ ] thyroid  Allergic/Immunologic:	 [ ] transplant [ ] seasonal    Vital Signs Last 24 Hrs  T(F): 97.7 (10-23-23 @ 16:31), Max: 103.1 (10-21-23 @ 20:51)    Vital Signs Last 24 Hrs  HR: 112 (10-23-23 @ 16:31) (105 - 116)  BP: 147/86 (10-23-23 @ 16:31) (147/86 - 181/72)  RR: 18 (10-23-23 @ 16:31)  SpO2: 94% (10-23-23 @ 16:31) (94% - 96%)  Wt(kg): --    PHYSICAL EXAM:  Constitutional: non-toxic, no distress  HEAD/EYES: anicteric, no conjunctival injection  ENT:  supple, no thrush  Cardiovascular:   normal S1, S2, no murmur, +edema  Respiratory:  clear BS bilaterally, no wheezes, no rales  GI:  soft, non-tender, normal bowel sounds  :  no ferrera, no CVA tenderness  Musculoskeletal:  no synovitis, normal ROM  Neurologic: awake and alert, normal strength, no focal findings  Skin:  no rash, LLE is warm, tender, swollen and erythematous   Heme/Onc: no lymphadenopathy   Psychiatric:  awake, alert, appropriate mood          WBC Count: 9.96 K/uL (10-23 @ 10:55)  WBC Count: 16.12 K/uL (10-22 @ 05:10)  WBC Count: 21.11 K/uL (10-21 @ 18:35)      Auto Neutrophil %: 91.4 % *H* (10-21-23 @ 18:35)  Auto Neutrophil #: 19.31 K/uL *H* (10-21-23 @ 18:35)                            13.2   9.96  )-----------( 249      ( 23 Oct 2023 10:55 )             40.3       10-23    137  |  105  |  10  ----------------------------<  331<H>  3.2<L>   |  25  |  0.77    Ca    8.8      23 Oct 2023 10:55        Creatinine Trend: 0.77<--, 0.69<--, 0.98<--        Lactate, Blood: 1.0 mmol/L (10-23-23 @ 10:55)  Lactate, Blood: 1.3 mmol/L (10-22-23 @ 02:45)      MICROBIOLOGY:  Clean Catch Clean Catch (Midstream)  10-21-23   <10,000 CFU/mL Normal Urogenital Lara  --  --      .Blood Blood-Peripheral  10-21-23   Growth in anaerobic bottle: Streptococcus agalactiae (Group B)  Direct identification is available within approximately 3-5  hours either by Blood Panel Multiplexed PCR or Direct  MALDI-TOF. Details: [email or web address]  --  Blood Culture PCR      .Blood Blood-Peripheral  10-21-23   No growth at 24 hours  --  --        SARS-CoV-2: NotDetec (21 Oct 2023 18:40)    Rapid RVP Result: NotDetec (10-21 @ 18:40)      RADIOLOGY:      ACC: [id number] EXAM:  CT CHEST IC   ORDERED BY: BRANDI AMBROSE     ACC: [id number] EXAM:  CT ABDOMEN AND PELVIS IC   ORDERED BY: BRANDI AMBROSE     PROCEDURE DATE:  10/21/2023          INTERPRETATION:  CT SCAN OF CHEST, ABDOMEN AND PELVIS    History: Coughing. Sepsis. Shortness of breath.    Technique: CT scan of chest, abdomen and pelvis performed from lung   apices through pubic symphysis. 90 cc of Omnipaque 350 were administered   intravenously. 10 cc discarded. Oral contrast was not utilized. Axial,   coronal, and sagittal multiplanar reformatted images were produced. Thin   section axial images and axial MIPS of the chest were also produced.    Comparison: None.    Findings: The study is limited by motion artifact.    Lungs and large airways: Normal.    Pleura:  No pleural effusion.    Mediastinum and hilar regions: No thoracic lymphadenopathy.    Heart and pericardium:  Cardiomegaly. No pericardial effusion.    Vessels:  Atherosclerotic changes of the aorta and coronary arteries.    Chest wall and lower neck:  Normal.    Liver:  Two linear densities in the liver, possibly calcifications..    Gallbladder: No radiopaque stones gallbladder.    Spleen:  Normal.    Pancreas:  Normal.    Adrenal glands:  Normal.    Kidneys: Small hypodensity left kidney, probably a cyst but evaluation   limited by motion. Right kidney within normal limits.    Abdominal and pelvic adenopathy:  No lymphadenopathy in abdomen or pelvis.    Ascites: None.    Gastrointestinal tract: Appendectomy.    Pelvicorgans: Prostate size within normal limits. Bladder unremarkable.    Soft tissues: Moderate sized fat-containing supraumbilical ventral hernia.    Bones: Degenerative changes. There are several sclerotic bone lesions in   the pelvis.      Impression:1. No cause for sepsis identified.    2. There are several sclerotic bone lesions in the pelvis. Recommend   correlation with PSA to rule out prostate cancer and blastic metastatic   disease.    3. Moderate size fat-containing supraumbilical ventral hernia.      JOSE E BKAER MD; Attending Radiologist  This document has been electronically signed. Oct 22 2023  8:24AM    ACC: [id number] EXAM:  CT CHEST IC   ORDERED BY: BRANID AMBROSE     ACC: [id number] EXAM:  CT ABDOMEN AND PELVIS IC   ORDERED BY: BRANDI AMBROSE     PROCEDURE DATE:  10/21/2023          INTERPRETATION:  CT SCAN OF CHEST, ABDOMEN AND PELVIS    History: Coughing. Sepsis. Shortness of breath.    Technique: CT scan of chest, abdomen and pelvis performed from lung   apices through pubic symphysis. 90 cc of Omnipaque 350 were administered   intravenously. 10 cc discarded. Oral contrast was not utilized. Axial,   coronal, and sagittal multiplanar reformatted images were produced. Thin   section axial images and axial MIPS of the chest were also produced.    Comparison: None.    Findings: The study is limited by motion artifact.    Lungs and large airways: Normal.    Pleura:  No pleural effusion.    Mediastinum and hilar regions: No thoracic lymphadenopathy.    Heart and pericardium:  Cardiomegaly. No pericardial effusion.    Vessels:  Atherosclerotic changes of the aorta and coronary arteries.    Chest wall and lower neck:  Normal.    Liver:  Two linear densities in the liver, possibly calcifications..    Gallbladder: No radiopaque stones gallbladder.    Spleen:  Normal.    Pancreas:  Normal.    Adrenal glands:  Normal.    Kidneys: Small hypodensity left kidney, probably a cyst but evaluation   limited by motion. Right kidney within normal limits.    Abdominal and pelvic adenopathy:  No lymphadenopathy in abdomen or pelvis.    Ascites: None.    Gastrointestinal tract: Appendectomy.    Pelvicorgans: Prostate size within normal limits. Bladder unremarkable.    Soft tissues: Moderate sized fat-containing supraumbilical ventral hernia.    Bones: Degenerative changes. There are several sclerotic bone lesions in   the pelvis.      Impression:1. No cause for sepsis identified.    2. There are several sclerotic bone lesions in the pelvis. Recommend   correlation with PSA to rule out prostate cancer and blastic metastatic   disease.    3. Moderate size fat-containing supraumbilical ventral hernia.    --- End of Report ---            JOSE E BAKER MD; Attending Radiologist  This document has been electronically signed. Oct 22 2023  8:24AM          I have personally reviewed the above imaging

## 2023-10-23 NOTE — PROGRESS NOTE ADULT - SUBJECTIVE AND OBJECTIVE BOX
Patient seen and examined  reports pain in leg  arterial and venous dopplers negative for PAD or dvt  ct lower extremity left ++ cellulitis --> no evidence of abscess  blood cultures + strep agalactiae  changed antibiotics to 2gm rocephin  gave one dose of vanco  dced doxy  ID consulted  echo ordered  Review of Systems:  General:denies fever chills, headache, weakness  HEENT: denies blurry vision,diffculty swallowing, difficulty hearing, tinnitus  Cardiovascular: denies chest pain  ,palpitations  Pulmonary:denies shortness of breath, cough, wheezing, hemoptysis  Gastrointestinal: denies abdominal pain, constipation, diarrhea,nausea , vomiting, hematochezia  : denies hematuria, dysuria, or incontinence  Neurological: denies weakness, numbness , tingling, dizziness, tremors  MSK: denies muscle pain, difficulty ambulating, swelling, back pain  skin: denies skin rash, itching, burning, or  skin lesions  Psychiatrical: denies mood disturbances, anxierty, feeling depressed, depression , or difficulty sleeping    Objective:  Vitals  T(C): 36.8 (10-23-23 @ 07:47), Max: 39.4 (10-22-23 @ 23:37)  HR: 105 (10-23-23 @ 07:47) (105 - 119)  BP: 165/83 (10-23-23 @ 07:47) (144/73 - 181/72)  RR: 18 (10-23-23 @ 07:47) (18 - 22)  SpO2: 94% (10-23-23 @ 07:47) (93% - 96%)    Physical Exam:  General: comfortable, no acute distress  HEENT: Atraumatic, no LAD, trachea midline, PERRLA  Cardiovascular: normal s1s2, no murmurs, gallops or fricition rubs  Pulmonary: clear to ausculation Bilaterally, no wheezing , rhonchi  Gastrointestinal: soft non tender non distended, no masses felt, no organomegally  Muscloskeletal: no lower extremity edema, intact bilateral lower extremity pulses  Neurological: CN II-12 intact. No focal weakness  Psychiatrical: normal mood, cooperative  SKIN: no rash, lesions or ulcers    Labs:                          12.1   16.12 )-----------( 243      ( 22 Oct 2023 05:10 )             37.0     10-22    142  |  111<H>  |  9   ----------------------------<  202<H>  3.1<L>   |  24  |  0.69    Ca    8.1<L>      22 Oct 2023 05:10    TPro  8.1  /  Alb  3.5  /  TBili  0.8  /  DBili  x   /  AST  17  /  ALT  45  /  AlkPhos  75  10-21    LIVER FUNCTIONS - ( 21 Oct 2023 18:35 )  Alb: 3.5 g/dL / Pro: 8.1 gm/dL / ALK PHOS: 75 U/L / ALT: 45 U/L / AST: 17 U/L / GGT: x           PT/INR - ( 21 Oct 2023 18:35 )   PT: 12.1 sec;   INR: 1.01 ratio         PTT - ( 21 Oct 2023 18:35 )  PTT:28.2 sec      Active Medications  MEDICATIONS  (STANDING):  aspirin  chewable 81 milliGRAM(s) Oral daily  atorvastatin 40 milliGRAM(s) Oral at bedtime  benzonatate 100 milliGRAM(s) Oral every 8 hours  cefTRIAXone   IVPB 2000 milliGRAM(s) IV Intermittent every 24 hours  dextrose 5%. 1000 milliLiter(s) (50 mL/Hr) IV Continuous <Continuous>  dextrose 5%. 1000 milliLiter(s) (100 mL/Hr) IV Continuous <Continuous>  dextrose 50% Injectable 25 Gram(s) IV Push once  dextrose 50% Injectable 12.5 Gram(s) IV Push once  dextrose 50% Injectable 25 Gram(s) IV Push once  enoxaparin Injectable 40 milliGRAM(s) SubCutaneous every 12 hours  glucagon  Injectable 1 milliGRAM(s) IntraMuscular once  influenza   Vaccine 0.5 milliLiter(s) IntraMuscular once  insulin lispro (ADMELOG) corrective regimen sliding scale   SubCutaneous three times a day before meals  lisinopril 40 milliGRAM(s) Oral daily  NIFEdipine XL 30 milliGRAM(s) Oral once  NIFEdipine XL 60 milliGRAM(s) Oral daily  tamsulosin 0.4 milliGRAM(s) Oral at bedtime  traMADol 25 milliGRAM(s) Oral once    MEDICATIONS  (PRN):  acetaminophen     Tablet .. 650 milliGRAM(s) Oral every 6 hours PRN Temp greater or equal to 38C (100.4F), Mild Pain (1 - 3)  aluminum hydroxide/magnesium hydroxide/simethicone Suspension 30 milliLiter(s) Oral every 4 hours PRN Dyspepsia  dextrose Oral Gel 15 Gram(s) Oral once PRN Blood Glucose LESS THAN 70 milliGRAM(s)/deciliter  melatonin 3 milliGRAM(s) Oral at bedtime PRN Insomnia  ondansetron Injectable 4 milliGRAM(s) IV Push every 8 hours PRN Nausea and/or Vomiting

## 2023-10-23 NOTE — CONSULT NOTE ADULT - ASSESSMENT
Marshall Harding is a 48 year old male with PMHx of HTN, HLD, and NIDDM2 who presented to the ED on 10/21/23 for complaints of cough.  In the ED, Tmax 103.1, HR as elevated as 138, RR as elevated as 25. WBC 21.11K, blood glucose 316. Lactic acid 2.8. RVP negative.   U/A with ketonuria, glucosuria.   CT A/P There are several sclerotic bone lesions in the pelvis. Recommend correlation with PSA to rule out prostate cancer and blastic metastatic disease.  Blood cultures with group B Streptococcus   LLE cellulitis present on exam   likely source of bacteremia from cellulitis   patient was coughing but his CT chest as well as lung exam is clear   unclear sign of pulmonary infection but perhaps manifestation of sepsis   antibiotics would likely cover CAP     Plan:  agree with ceftriaxone 2g q24hrs   please repeat blood cultures x2 sets to ensure clearance   ideal to treat for a minimum of 2 weeks and sometimes extend therapy to 4 weeks   Transthoracic Echocardiogram   no placement of midline or picc line yet   control his glucose levels and avoid hypoglycemic episodes   trend wbc   trend fever  send PSA or consider biopsy of lesions     Discussed with Dr. Marguerite Fritz, DO  Infectious Disease Attending  Reachable via Microsoft Teams or ID office: [phone]  After 5pm/weekends please call [phone] for all inquiries and new consults

## 2023-10-23 NOTE — OCCUPATIONAL THERAPY INITIAL EVALUATION ADULT - PERTINENT HX OF CURRENT PROBLEM, REHAB EVAL
Marshall Harding is a 48 year old male with PMHx of HTN, HLD, and NIDDM2 who presented to the ED on 10/21/23 for complaints of cough. Patient reports he has been having cough for the past three days. Sometimes, it is with clear sputum and other times, it is green/yellow in color. Tried taking Robitussin with mild alleviation of cough. Denies sick contacts. Recent travel to [place] and states he got bitten by mosquitoes while there. Associated headache and left leg tenderness. No trauma to the left leg. Denies neck pain, chest pain, shortness of breath, abdominal pain, diarrhea, or constipation. Of note, since he has not felt well for the past few days, he did not take any of his diabetic medications

## 2023-10-24 LAB
-  CEFTRIAXONE: SIGNIFICANT CHANGE UP
-  CEFTRIAXONE: SIGNIFICANT CHANGE UP
-  CLINDAMYCIN: SIGNIFICANT CHANGE UP
-  CLINDAMYCIN: SIGNIFICANT CHANGE UP
-  LEVOFLOXACIN: SIGNIFICANT CHANGE UP
-  LEVOFLOXACIN: SIGNIFICANT CHANGE UP
-  PENICILLIN: SIGNIFICANT CHANGE UP
-  PENICILLIN: SIGNIFICANT CHANGE UP
-  VANCOMYCIN: SIGNIFICANT CHANGE UP
-  VANCOMYCIN: SIGNIFICANT CHANGE UP
ANION GAP SERPL CALC-SCNC: 7 MMOL/L — SIGNIFICANT CHANGE UP (ref 5–17)
ANION GAP SERPL CALC-SCNC: 7 MMOL/L — SIGNIFICANT CHANGE UP (ref 5–17)
BASOPHILS # BLD AUTO: 0.02 K/UL — SIGNIFICANT CHANGE UP (ref 0–0.2)
BASOPHILS # BLD AUTO: 0.02 K/UL — SIGNIFICANT CHANGE UP (ref 0–0.2)
BASOPHILS NFR BLD AUTO: 0.4 % — SIGNIFICANT CHANGE UP (ref 0–2)
BASOPHILS NFR BLD AUTO: 0.4 % — SIGNIFICANT CHANGE UP (ref 0–2)
BUN SERPL-MCNC: 10 MG/DL — SIGNIFICANT CHANGE UP (ref 7–23)
BUN SERPL-MCNC: 10 MG/DL — SIGNIFICANT CHANGE UP (ref 7–23)
CALCIUM SERPL-MCNC: 8.8 MG/DL — SIGNIFICANT CHANGE UP (ref 8.5–10.1)
CALCIUM SERPL-MCNC: 8.8 MG/DL — SIGNIFICANT CHANGE UP (ref 8.5–10.1)
CHLORIDE SERPL-SCNC: 105 MMOL/L — SIGNIFICANT CHANGE UP (ref 96–108)
CHLORIDE SERPL-SCNC: 105 MMOL/L — SIGNIFICANT CHANGE UP (ref 96–108)
CO2 SERPL-SCNC: 27 MMOL/L — SIGNIFICANT CHANGE UP (ref 22–31)
CO2 SERPL-SCNC: 27 MMOL/L — SIGNIFICANT CHANGE UP (ref 22–31)
CREAT SERPL-MCNC: 0.74 MG/DL — SIGNIFICANT CHANGE UP (ref 0.5–1.3)
CREAT SERPL-MCNC: 0.74 MG/DL — SIGNIFICANT CHANGE UP (ref 0.5–1.3)
CULTURE RESULTS: SIGNIFICANT CHANGE UP
CULTURE RESULTS: SIGNIFICANT CHANGE UP
EGFR: 110 ML/MIN/1.73M2 — SIGNIFICANT CHANGE UP
EGFR: 110 ML/MIN/1.73M2 — SIGNIFICANT CHANGE UP
EOSINOPHIL # BLD AUTO: 0.14 K/UL — SIGNIFICANT CHANGE UP (ref 0–0.5)
EOSINOPHIL # BLD AUTO: 0.14 K/UL — SIGNIFICANT CHANGE UP (ref 0–0.5)
EOSINOPHIL NFR BLD AUTO: 2.5 % — SIGNIFICANT CHANGE UP (ref 0–6)
EOSINOPHIL NFR BLD AUTO: 2.5 % — SIGNIFICANT CHANGE UP (ref 0–6)
GLUCOSE BLDC GLUCOMTR-MCNC: 222 MG/DL — HIGH (ref 70–99)
GLUCOSE BLDC GLUCOMTR-MCNC: 222 MG/DL — HIGH (ref 70–99)
GLUCOSE BLDC GLUCOMTR-MCNC: 247 MG/DL — HIGH (ref 70–99)
GLUCOSE BLDC GLUCOMTR-MCNC: 247 MG/DL — HIGH (ref 70–99)
GLUCOSE BLDC GLUCOMTR-MCNC: 258 MG/DL — HIGH (ref 70–99)
GLUCOSE BLDC GLUCOMTR-MCNC: 258 MG/DL — HIGH (ref 70–99)
GLUCOSE BLDC GLUCOMTR-MCNC: 310 MG/DL — HIGH (ref 70–99)
GLUCOSE BLDC GLUCOMTR-MCNC: 310 MG/DL — HIGH (ref 70–99)
GLUCOSE SERPL-MCNC: 230 MG/DL — HIGH (ref 70–99)
GLUCOSE SERPL-MCNC: 230 MG/DL — HIGH (ref 70–99)
GRAM STN FLD: SIGNIFICANT CHANGE UP
GRAM STN FLD: SIGNIFICANT CHANGE UP
HCT VFR BLD CALC: 39 % — SIGNIFICANT CHANGE UP (ref 39–50)
HCT VFR BLD CALC: 39 % — SIGNIFICANT CHANGE UP (ref 39–50)
HGB BLD-MCNC: 12.9 G/DL — LOW (ref 13–17)
HGB BLD-MCNC: 12.9 G/DL — LOW (ref 13–17)
HIV 1+2 AB+HIV1 P24 AG SERPL QL IA: SIGNIFICANT CHANGE UP
HIV 1+2 AB+HIV1 P24 AG SERPL QL IA: SIGNIFICANT CHANGE UP
IMM GRANULOCYTES NFR BLD AUTO: 0.7 % — SIGNIFICANT CHANGE UP (ref 0–0.9)
IMM GRANULOCYTES NFR BLD AUTO: 0.7 % — SIGNIFICANT CHANGE UP (ref 0–0.9)
LYMPHOCYTES # BLD AUTO: 1.41 K/UL — SIGNIFICANT CHANGE UP (ref 1–3.3)
LYMPHOCYTES # BLD AUTO: 1.41 K/UL — SIGNIFICANT CHANGE UP (ref 1–3.3)
LYMPHOCYTES # BLD AUTO: 25.1 % — SIGNIFICANT CHANGE UP (ref 13–44)
LYMPHOCYTES # BLD AUTO: 25.1 % — SIGNIFICANT CHANGE UP (ref 13–44)
MAGNESIUM SERPL-MCNC: 2.2 MG/DL — SIGNIFICANT CHANGE UP (ref 1.6–2.6)
MAGNESIUM SERPL-MCNC: 2.2 MG/DL — SIGNIFICANT CHANGE UP (ref 1.6–2.6)
MCHC RBC-ENTMCNC: 27.4 PG — SIGNIFICANT CHANGE UP (ref 27–34)
MCHC RBC-ENTMCNC: 27.4 PG — SIGNIFICANT CHANGE UP (ref 27–34)
MCHC RBC-ENTMCNC: 33.1 G/DL — SIGNIFICANT CHANGE UP (ref 32–36)
MCHC RBC-ENTMCNC: 33.1 G/DL — SIGNIFICANT CHANGE UP (ref 32–36)
MCV RBC AUTO: 82.8 FL — SIGNIFICANT CHANGE UP (ref 80–100)
MCV RBC AUTO: 82.8 FL — SIGNIFICANT CHANGE UP (ref 80–100)
METHOD TYPE: SIGNIFICANT CHANGE UP
METHOD TYPE: SIGNIFICANT CHANGE UP
MONOCYTES # BLD AUTO: 0.69 K/UL — SIGNIFICANT CHANGE UP (ref 0–0.9)
MONOCYTES # BLD AUTO: 0.69 K/UL — SIGNIFICANT CHANGE UP (ref 0–0.9)
MONOCYTES NFR BLD AUTO: 12.3 % — SIGNIFICANT CHANGE UP (ref 2–14)
MONOCYTES NFR BLD AUTO: 12.3 % — SIGNIFICANT CHANGE UP (ref 2–14)
NEUTROPHILS # BLD AUTO: 3.32 K/UL — SIGNIFICANT CHANGE UP (ref 1.8–7.4)
NEUTROPHILS # BLD AUTO: 3.32 K/UL — SIGNIFICANT CHANGE UP (ref 1.8–7.4)
NEUTROPHILS NFR BLD AUTO: 59 % — SIGNIFICANT CHANGE UP (ref 43–77)
NEUTROPHILS NFR BLD AUTO: 59 % — SIGNIFICANT CHANGE UP (ref 43–77)
NRBC # BLD: 0 /100 WBCS — SIGNIFICANT CHANGE UP (ref 0–0)
NRBC # BLD: 0 /100 WBCS — SIGNIFICANT CHANGE UP (ref 0–0)
ORGANISM # SPEC MICROSCOPIC CNT: SIGNIFICANT CHANGE UP
PHOSPHATE SERPL-MCNC: 3.5 MG/DL — SIGNIFICANT CHANGE UP (ref 2.5–4.5)
PHOSPHATE SERPL-MCNC: 3.5 MG/DL — SIGNIFICANT CHANGE UP (ref 2.5–4.5)
PLATELET # BLD AUTO: 253 K/UL — SIGNIFICANT CHANGE UP (ref 150–400)
PLATELET # BLD AUTO: 253 K/UL — SIGNIFICANT CHANGE UP (ref 150–400)
POTASSIUM SERPL-MCNC: 3.4 MMOL/L — LOW (ref 3.5–5.3)
POTASSIUM SERPL-MCNC: 3.4 MMOL/L — LOW (ref 3.5–5.3)
POTASSIUM SERPL-SCNC: 3.4 MMOL/L — LOW (ref 3.5–5.3)
POTASSIUM SERPL-SCNC: 3.4 MMOL/L — LOW (ref 3.5–5.3)
RBC # BLD: 4.71 M/UL — SIGNIFICANT CHANGE UP (ref 4.2–5.8)
RBC # BLD: 4.71 M/UL — SIGNIFICANT CHANGE UP (ref 4.2–5.8)
RBC # FLD: 13.6 % — SIGNIFICANT CHANGE UP (ref 10.3–14.5)
RBC # FLD: 13.6 % — SIGNIFICANT CHANGE UP (ref 10.3–14.5)
SODIUM SERPL-SCNC: 139 MMOL/L — SIGNIFICANT CHANGE UP (ref 135–145)
SODIUM SERPL-SCNC: 139 MMOL/L — SIGNIFICANT CHANGE UP (ref 135–145)
SPECIMEN SOURCE: SIGNIFICANT CHANGE UP
SPECIMEN SOURCE: SIGNIFICANT CHANGE UP
WBC # BLD: 5.62 K/UL — SIGNIFICANT CHANGE UP (ref 3.8–10.5)
WBC # BLD: 5.62 K/UL — SIGNIFICANT CHANGE UP (ref 3.8–10.5)
WBC # FLD AUTO: 5.62 K/UL — SIGNIFICANT CHANGE UP (ref 3.8–10.5)
WBC # FLD AUTO: 5.62 K/UL — SIGNIFICANT CHANGE UP (ref 3.8–10.5)

## 2023-10-24 PROCEDURE — 99232 SBSQ HOSP IP/OBS MODERATE 35: CPT

## 2023-10-24 RX ORDER — POTASSIUM CHLORIDE 20 MEQ
40 PACKET (EA) ORAL ONCE
Refills: 0 | Status: COMPLETED | OUTPATIENT
Start: 2023-10-24 | End: 2023-10-24

## 2023-10-24 RX ORDER — INSULIN GLARGINE 100 [IU]/ML
8 INJECTION, SOLUTION SUBCUTANEOUS AT BEDTIME
Refills: 0 | Status: DISCONTINUED | OUTPATIENT
Start: 2023-10-24 | End: 2023-10-26

## 2023-10-24 RX ADMIN — ENOXAPARIN SODIUM 40 MILLIGRAM(S): 100 INJECTION SUBCUTANEOUS at 05:14

## 2023-10-24 RX ADMIN — Medication 100 MILLIGRAM(S): at 05:14

## 2023-10-24 RX ADMIN — LISINOPRIL 40 MILLIGRAM(S): 2.5 TABLET ORAL at 05:13

## 2023-10-24 RX ADMIN — CEFTRIAXONE 100 MILLIGRAM(S): 500 INJECTION, POWDER, FOR SOLUTION INTRAMUSCULAR; INTRAVENOUS at 15:35

## 2023-10-24 RX ADMIN — ENOXAPARIN SODIUM 40 MILLIGRAM(S): 100 INJECTION SUBCUTANEOUS at 17:04

## 2023-10-24 RX ADMIN — Medication 100 MILLIGRAM(S): at 21:07

## 2023-10-24 RX ADMIN — Medication 81 MILLIGRAM(S): at 11:37

## 2023-10-24 RX ADMIN — Medication 2: at 17:02

## 2023-10-24 RX ADMIN — INSULIN GLARGINE 8 UNIT(S): 100 INJECTION, SOLUTION SUBCUTANEOUS at 21:30

## 2023-10-24 RX ADMIN — Medication 60 MILLIGRAM(S): at 05:13

## 2023-10-24 RX ADMIN — ATORVASTATIN CALCIUM 40 MILLIGRAM(S): 80 TABLET, FILM COATED ORAL at 21:07

## 2023-10-24 RX ADMIN — Medication 40 MILLIEQUIVALENT(S): at 11:35

## 2023-10-24 RX ADMIN — Medication 650 MILLIGRAM(S): at 22:40

## 2023-10-24 RX ADMIN — Medication 100 MILLIGRAM(S): at 13:27

## 2023-10-24 RX ADMIN — Medication 650 MILLIGRAM(S): at 21:30

## 2023-10-24 RX ADMIN — TAMSULOSIN HYDROCHLORIDE 0.4 MILLIGRAM(S): 0.4 CAPSULE ORAL at 21:07

## 2023-10-24 NOTE — PROGRESS NOTE ADULT - SUBJECTIVE AND OBJECTIVE BOX
Patient seen and examined  leg better  ambulates well reports his leg is better when ambulates  repeat blood cx negative (10.22)  on 2gm rocephin although sensitive to levoquin  initially refusing insulin, only wants orral dm meds (due to job), after extensive counceled now agrees  also agreesto midline for abx  Review of Systems:  General:denies fever chills, headache, weakness  HEENT: denies blurry vision,diffculty swallowing, difficulty hearing, tinnitus  Cardiovascular: denies chest pain  ,palpitations  Pulmonary:denies shortness of breath, cough, wheezing, hemoptysis  Gastrointestinal: denies abdominal pain, constipation, diarrhea,nausea , vomiting, hematochezia  : denies hematuria, dysuria, or incontinence  Neurological: denies weakness, numbness , tingling, dizziness, tremors  MSK: denies muscle pain, difficulty ambulating, swelling, back pain  skin: denies skin rash, itching, burning, or  skin lesions  Psychiatrical: denies mood disturbances, anxierty, feeling depressed, depression , or difficulty sleeping    Objective:  Vitals  T(C): 36.9 (10-24-23 @ 10:50), Max: 37.1 (10-23-23 @ 23:44)  HR: 109 (10-24-23 @ 10:50) (99 - 112)  BP: 153/95 (10-24-23 @ 10:50) (142/77 - 156/93)  RR: 19 (10-24-23 @ 10:50) (18 - 20)  SpO2: 90% (10-24-23 @ 10:50) (90% - 95%)    Physical Exam:  General: comfortable, no acute distress  HEENT: Atraumatic, no LAD, trachea midline, PERRLA  Cardiovascular: normal s1s2, no murmurs, gallops or fricition rubs  Pulmonary: clear to ausculation Bilaterally, no wheezing , rhonchi  Gastrointestinal: soft non tender non distended, no masses felt, no organomegally  Muscloskeletal: no lower extremity edema, intact bilateral lower extremity pulses  Neurological: CN II-12 intact. No focal weakness  Psychiatrical: normal mood, cooperative  SKIN: no rash, lesions or ulcers    Labs:                          12.9   5.62  )-----------( 253      ( 24 Oct 2023 07:59 )             39.0     10-24    139  |  105  |  10  ----------------------------<  230<H>  3.4<L>   |  27  |  0.74    Ca    8.8      24 Oct 2023 07:59  Phos  3.5     10-24  Mg     2.2     10-24              Active Medications  MEDICATIONS  (STANDING):  aspirin  chewable 81 milliGRAM(s) Oral daily  atorvastatin 40 milliGRAM(s) Oral at bedtime  benzonatate 100 milliGRAM(s) Oral every 8 hours  cefTRIAXone   IVPB 2000 milliGRAM(s) IV Intermittent every 24 hours  dextrose 5%. 1000 milliLiter(s) (50 mL/Hr) IV Continuous <Continuous>  dextrose 5%. 1000 milliLiter(s) (100 mL/Hr) IV Continuous <Continuous>  dextrose 50% Injectable 25 Gram(s) IV Push once  dextrose 50% Injectable 12.5 Gram(s) IV Push once  dextrose 50% Injectable 25 Gram(s) IV Push once  enoxaparin Injectable 40 milliGRAM(s) SubCutaneous every 12 hours  glucagon  Injectable 1 milliGRAM(s) IntraMuscular once  influenza   Vaccine 0.5 milliLiter(s) IntraMuscular once  insulin glargine Injectable (LANTUS) 8 Unit(s) SubCutaneous at bedtime  insulin lispro (ADMELOG) corrective regimen sliding scale   SubCutaneous three times a day before meals  lisinopril 40 milliGRAM(s) Oral daily  NIFEdipine XL 60 milliGRAM(s) Oral daily  tamsulosin 0.4 milliGRAM(s) Oral at bedtime  traMADol 25 milliGRAM(s) Oral once    MEDICATIONS  (PRN):  acetaminophen     Tablet .. 650 milliGRAM(s) Oral every 6 hours PRN Temp greater or equal to 38C (100.4F), Mild Pain (1 - 3)  aluminum hydroxide/magnesium hydroxide/simethicone Suspension 30 milliLiter(s) Oral every 4 hours PRN Dyspepsia  dextrose Oral Gel 15 Gram(s) Oral once PRN Blood Glucose LESS THAN 70 milliGRAM(s)/deciliter  melatonin 3 milliGRAM(s) Oral at bedtime PRN Insomnia  ondansetron Injectable 4 milliGRAM(s) IV Push every 8 hours PRN Nausea and/or Vomiting

## 2023-10-24 NOTE — CHART NOTE - NSCHARTNOTEFT_GEN_A_CORE
Called by IR re: midline order.  Chart reviewed; per ID note from today, NO placement of midline/PICC yet.  ID NP confirms that patient is still pending echo and final blood cultures to determine duration of antibiotics.  Please call us back as needed if/when midline placement becomes appropriate.

## 2023-10-24 NOTE — PROGRESS NOTE ADULT - SUBJECTIVE AND OBJECTIVE BOX
ANJELICA WHITLEY  MRN-[id number]    Follow Up:  bacteremia, cellulitis     Interval History: the pt was seen and examined earlier, no acute distress, states that he is feeling better, pt's mother is present. Pt is afebrile since 10/22, RA, no leukocytosis.     PAST MEDICAL & SURGICAL HISTORY:  Diabetes mellitus      Hypertension      High cholesterol          ROS:    [ ] Unobtainable because:  [x ] All other systems negative    Constitutional: no fever, no chills  Head: no trauma  Eyes: no vision changes, no eye pain  ENT:  no sore throat, no rhinorrhea  Cardiovascular:  no chest pain, no palpitation  Respiratory:  no SOB, no cough  GI:  no abd pain, no vomiting, no diarrhea  urinary: no dysuria, no hematuria, no flank pain  musculoskeletal:  Left LE is less tender   skin:  no rash  neurology:  no headache, no seizure, no change in mental status  psych: no anxiety, no depression         Allergies  No Known Allergies        ANTIMICROBIALS:  cefTRIAXone   IVPB 2000 every 24 hours      OTHER MEDS:  acetaminophen     Tablet .. 650 milliGRAM(s) Oral every 6 hours PRN  aluminum hydroxide/magnesium hydroxide/simethicone Suspension 30 milliLiter(s) Oral every 4 hours PRN  aspirin  chewable 81 milliGRAM(s) Oral daily  atorvastatin 40 milliGRAM(s) Oral at bedtime  benzonatate 100 milliGRAM(s) Oral every 8 hours  dextrose 5%. 1000 milliLiter(s) IV Continuous <Continuous>  dextrose 5%. 1000 milliLiter(s) IV Continuous <Continuous>  dextrose 50% Injectable 25 Gram(s) IV Push once  dextrose 50% Injectable 12.5 Gram(s) IV Push once  dextrose 50% Injectable 25 Gram(s) IV Push once  dextrose Oral Gel 15 Gram(s) Oral once PRN  enoxaparin Injectable 40 milliGRAM(s) SubCutaneous every 12 hours  glucagon  Injectable 1 milliGRAM(s) IntraMuscular once  influenza   Vaccine 0.5 milliLiter(s) IntraMuscular once  insulin glargine Injectable (LANTUS) 8 Unit(s) SubCutaneous at bedtime  insulin lispro (ADMELOG) corrective regimen sliding scale   SubCutaneous three times a day before meals  lisinopril 40 milliGRAM(s) Oral daily  melatonin 3 milliGRAM(s) Oral at bedtime PRN  NIFEdipine XL 60 milliGRAM(s) Oral daily  ondansetron Injectable 4 milliGRAM(s) IV Push every 8 hours PRN  tamsulosin 0.4 milliGRAM(s) Oral at bedtime  traMADol 25 milliGRAM(s) Oral once      Vital Signs Last 24 Hrs  T(C): 36.9 (24 Oct 2023 10:50), Max: 37.1 (23 Oct 2023 23:44)  T(F): 98.5 (24 Oct 2023 10:50), Max: 98.8 (23 Oct 2023 23:44)  HR: 109 (24 Oct 2023 10:50) (99 - 112)  BP: 153/95 (24 Oct 2023 10:50) (142/77 - 156/93)  BP(mean): --  RR: 19 (24 Oct 2023 10:50) (18 - 20)  SpO2: 90% (24 Oct 2023 10:50) (90% - 95%)    Parameters below as of 24 Oct 2023 10:50  Patient On (Oxygen Delivery Method): room air        Physical Exam:  Constitutional: non-toxic, no distress, RA  HEAD/EYES: anicteric, no conjunctival injection  ENT:  supple, no thrush  Cardiovascular:   normal S1, S2, no murmur, +edema  Respiratory:  clear BS bilaterally, no wheezes, no rales  GI:  soft, non-tender, normal bowel sounds  :  no ferrera, no CVA tenderness  Musculoskeletal:  no synovitis, normal ROM  Neurologic: awake and alert, normal strength, no focal findings  Skin:  no rash, LLE is mildly warm, less tender, swelling and erythema with some improvement  Heme/Onc: no lymphadenopathy   Psychiatric:  awake, alert, appropriate mood    WBC Count: 5.62 K/uL (10-24 @ 07:59)  WBC Count: 9.96 K/uL (10-23 @ 10:55)  WBC Count: 16.12 K/uL (10-22 @ 05:10)  WBC Count: 21.11 K/uL (10-21 @ 18:35)                            12.9   5.62  )-----------( 253      ( 24 Oct 2023 07:59 )             39.0       10-24    139  |  105  |  10  ----------------------------<  230<H>  3.4<L>   |  27  |  0.74    Ca    8.8      24 Oct 2023 07:59  Phos  3.5     10-24  Mg     2.2     10-24        Urinalysis Basic - ( 24 Oct 2023 07:59 )    Color: x / Appearance: x / SG: x / pH: x  Gluc: 230 mg/dL / Ketone: x  / Bili: x / Urobili: x   Blood: x / Protein: x / Nitrite: x   Leuk Esterase: x / RBC: x / WBC x   Sq Epi: x / Non Sq Epi: x / Bacteria: x        Creatinine Trend: 0.74<--, 0.77<--, 0.69<--, 0.98<--  Lactate, Blood: 1.0 mmol/L (10-23-23 @ 10:55)      MICROBIOLOGY:  v  .Blood Blood-Peripheral  10-22-23   No growth at 24 hours  --  --      .Blood Blood-Peripheral  10-22-23   No growth at 24 hours  --  --      Clean Catch Clean Catch (Midstream)  10-21-23   <10,000 CFU/mL Normal Urogenital Lara  --  --      .Blood Blood-Peripheral  10-21-23   Growth in anaerobic bottle: Streptococcus agalactiae (Group B)  Direct identification is available within approximately 3-5  hours either by Blood Panel Multiplexed PCR or Direct  MALDI-TOF. Details: [email or web address]  --  Blood Culture PCR  Streptococcus agalactiae (Group B)      .Blood Blood-Peripheral  10-21-23   No growth at 48 Hours  --  --          Rapid RVP Result: NotDetec (10-21 @ 18:40)      D-Dimer Assay, Quantitative: 169 (10-21)    Procalcitonin, Serum: 6.02 (10-22-23 @ 01:10)    SARS-CoV-2: NotDetec (10-21-23 @ 18:40)  Rapid RVP Result: NotDetec (10-21-23 @ 18:40)    SARS-CoV-2: NotDetec (21 Oct 2023 18:40)    A1C with Estimated Average Glucose in AM (10.22.23 @ 05:10)    A1C with Estimated Average Glucose Result: 10.6: Method: Immunoassay       Reference Range                4.0-5.6%       High risk (prediabetic)        5.7-6.4%       Diabetic, diagnostic             >=6.5%       ADA diabetic treatment goal       <7.0%  The Hemoglobin A1c testing is NGSP-certified.Reference ranges are based  upon the 2010 recommendations of  the American Diabetes Association.  Interpretation may vary for children  and adolescents. %   Estimated Average Glucose: 258: The Estimated Average Glucose (eAG) or Mean Plasma Glucose (MPG) value is  calculated from the hemoglobin A1c value and covers the same time period.   The American Diabetes Association (ADA) and other professional  organizations recommend reporting the eAG with the HgbA1c. mg/dL        RADIOLOGY:

## 2023-10-25 LAB
GLUCOSE BLDC GLUCOMTR-MCNC: 206 MG/DL — HIGH (ref 70–99)
GLUCOSE BLDC GLUCOMTR-MCNC: 206 MG/DL — HIGH (ref 70–99)
GLUCOSE BLDC GLUCOMTR-MCNC: 213 MG/DL — HIGH (ref 70–99)
GLUCOSE BLDC GLUCOMTR-MCNC: 213 MG/DL — HIGH (ref 70–99)
GLUCOSE BLDC GLUCOMTR-MCNC: 220 MG/DL — HIGH (ref 70–99)
GLUCOSE BLDC GLUCOMTR-MCNC: 220 MG/DL — HIGH (ref 70–99)
GLUCOSE BLDC GLUCOMTR-MCNC: 346 MG/DL — HIGH (ref 70–99)
GLUCOSE BLDC GLUCOMTR-MCNC: 346 MG/DL — HIGH (ref 70–99)
PSA FLD-MCNC: 0.64 NG/ML — SIGNIFICANT CHANGE UP (ref 0–4)
PSA FLD-MCNC: 0.64 NG/ML — SIGNIFICANT CHANGE UP (ref 0–4)

## 2023-10-25 PROCEDURE — 99232 SBSQ HOSP IP/OBS MODERATE 35: CPT

## 2023-10-25 PROCEDURE — 93306 TTE W/DOPPLER COMPLETE: CPT | Mod: 26

## 2023-10-25 RX ADMIN — Medication 2: at 08:20

## 2023-10-25 RX ADMIN — Medication 100 MILLIGRAM(S): at 22:26

## 2023-10-25 RX ADMIN — Medication 100 MILLIGRAM(S): at 05:14

## 2023-10-25 RX ADMIN — Medication 81 MILLIGRAM(S): at 11:35

## 2023-10-25 RX ADMIN — Medication 100 MILLIGRAM(S): at 14:07

## 2023-10-25 RX ADMIN — ATORVASTATIN CALCIUM 40 MILLIGRAM(S): 80 TABLET, FILM COATED ORAL at 22:26

## 2023-10-25 RX ADMIN — Medication 2: at 11:35

## 2023-10-25 RX ADMIN — LISINOPRIL 40 MILLIGRAM(S): 2.5 TABLET ORAL at 05:14

## 2023-10-25 RX ADMIN — TAMSULOSIN HYDROCHLORIDE 0.4 MILLIGRAM(S): 0.4 CAPSULE ORAL at 22:26

## 2023-10-25 RX ADMIN — CEFTRIAXONE 100 MILLIGRAM(S): 500 INJECTION, POWDER, FOR SOLUTION INTRAMUSCULAR; INTRAVENOUS at 14:12

## 2023-10-25 RX ADMIN — ENOXAPARIN SODIUM 40 MILLIGRAM(S): 100 INJECTION SUBCUTANEOUS at 05:15

## 2023-10-25 RX ADMIN — ENOXAPARIN SODIUM 40 MILLIGRAM(S): 100 INJECTION SUBCUTANEOUS at 17:47

## 2023-10-25 RX ADMIN — Medication 60 MILLIGRAM(S): at 05:14

## 2023-10-25 RX ADMIN — INSULIN GLARGINE 8 UNIT(S): 100 INJECTION, SOLUTION SUBCUTANEOUS at 22:27

## 2023-10-25 NOTE — PROGRESS NOTE ADULT - SUBJECTIVE AND OBJECTIVE BOX
HPI:  Marshall Harding is a 48 year old male with PMHx of HTN, HLD, and NIDDM2 who presented to the ED on 10/21/23 for complaints of cough.    Patient reports he has been having cough for the past three days. Sometimes, it is with clear sputum and other times, it is green/yellow in color. Tried taking Robitussin with mild alleviation of cough. Denies sick contacts. Recent travel to [place] and states he got bitten by mosquitoes while there. Associated headache and left leg tenderness. No trauma to the left leg. Denies neck pain, chest pain, shortness of breath, abdominal pain, diarrhea, or constipation. Of note, since he has not felt well for the past few days, he did not take any of his diabetic medications.    In the ED, Tmax 103.1, HR as elevated as 138, RR as elevated as 25. WBC 21.11K, blood glucose 316. Lactic acid 2.8. RVP negative. U/A with ketonuria, glucosuria. CT A/P without signs of infection. CT chest performed, official read not in but as per ER physician, Dr. Torres, she states she spoke to radiologist who states CT chest unremarkable. Received 3.5L NS bolus, ceftriaxone, and azithromycin.  (22 Oct 2023 01:58)    Patient is a 48y old  Male who presents with a chief complaint of Sepsis secondary to suspected LLE cellulitis (25 Oct 2023 15:54)      INTERVAL HPI/OVERNIGHT EVENTS: no acute events overnight stares that he will only do IV antibiotics at rehab     MEDICATIONS  (STANDING):  aspirin  chewable 81 milliGRAM(s) Oral daily  atorvastatin 40 milliGRAM(s) Oral at bedtime  benzonatate 100 milliGRAM(s) Oral every 8 hours  cefTRIAXone   IVPB 2000 milliGRAM(s) IV Intermittent every 24 hours  dextrose 5%. 1000 milliLiter(s) (50 mL/Hr) IV Continuous <Continuous>  dextrose 5%. 1000 milliLiter(s) (100 mL/Hr) IV Continuous <Continuous>  dextrose 50% Injectable 25 Gram(s) IV Push once  dextrose 50% Injectable 12.5 Gram(s) IV Push once  dextrose 50% Injectable 25 Gram(s) IV Push once  enoxaparin Injectable 40 milliGRAM(s) SubCutaneous every 12 hours  glucagon  Injectable 1 milliGRAM(s) IntraMuscular once  influenza   Vaccine 0.5 milliLiter(s) IntraMuscular once  insulin glargine Injectable (LANTUS) 8 Unit(s) SubCutaneous at bedtime  insulin lispro (ADMELOG) corrective regimen sliding scale   SubCutaneous three times a day before meals  lisinopril 40 milliGRAM(s) Oral daily  NIFEdipine XL 60 milliGRAM(s) Oral daily  tamsulosin 0.4 milliGRAM(s) Oral at bedtime  traMADol 25 milliGRAM(s) Oral once    MEDICATIONS  (PRN):  acetaminophen     Tablet .. 650 milliGRAM(s) Oral every 6 hours PRN Temp greater or equal to 38C (100.4F), Mild Pain (1 - 3)  aluminum hydroxide/magnesium hydroxide/simethicone Suspension 30 milliLiter(s) Oral every 4 hours PRN Dyspepsia  dextrose Oral Gel 15 Gram(s) Oral once PRN Blood Glucose LESS THAN 70 milliGRAM(s)/deciliter  melatonin 3 milliGRAM(s) Oral at bedtime PRN Insomnia  ondansetron Injectable 4 milliGRAM(s) IV Push every 8 hours PRN Nausea and/or Vomiting      Allergies    No Known Allergies    Intolerances        REVIEW OF SYSTEMS:  CONSTITUTIONAL: No fever, weight loss, or fatigue  EYES: No eye pain, visual disturbances, or discharge  ENMT:  No difficulty hearing, tinnitus, vertigo; No sinus or throat pain  NECK: No pain or stiffness  BREASTS: No pain, masses, or nipple discharge  RESPIRATORY: No cough, wheezing, chills or hemoptysis; No shortness of breath  CARDIOVASCULAR: No chest pain, palpitations, dizziness, or leg swelling  GASTROINTESTINAL: No abdominal or epigastric pain. No nausea, vomiting, or hematemesis; No diarrhea or constipation. No melena or hematochezia.  GENITOURINARY: No dysuria, frequency, hematuria, or incontinence  NEUROLOGICAL: No headaches, memory loss, loss of strength, numbness, or tremors  SKIN: No itching, burning, rashes, or lesions   LYMPH NODES: No enlarged glands  ENDOCRINE: No heat or cold intolerance; No hair loss  MUSCULOSKELETAL: No joint pain or swelling; No muscle, back, or extremity pain  PSYCHIATRIC: No depression, anxiety, mood swings, or difficulty sleeping  HEME/LYMPH: No easy bruising, or bleeding gums  ALLERGY AND IMMUNOLOGIC: No hives or eczema    Vital Signs Last 24 Hrs  T(C): 37.1 (25 Oct 2023 15:55), Max: 37.3 (25 Oct 2023 10:49)  T(F): 98.8 (25 Oct 2023 15:55), Max: 99.2 (25 Oct 2023 10:49)  HR: 99 (25 Oct 2023 15:55) (58 - 99)  BP: 159/95 (25 Oct 2023 15:55) (131/84 - 159/95)-  RR: 18 (25 Oct 2023 15:55) (18 - 18)  SpO2: 94% (25 Oct 2023 15:55) (93% - 96%)    Parameters below as of 25 Oct 2023 05:00  Patient On (Oxygen Delivery Method): room air        PHYSICAL EXAM:  GENERAL: NAD, well-groomed, well-developed  HEAD:  Atraumatic, Normocephalic  EYES: EOMI, PERRLA, conjunctiva and sclera clear  ENMT: No tonsillar erythema, exudates, or enlargement; Moist mucous membranes, Good dentition, No lesions  NECK: Supple, No JVD, Normal thyroid  NERVOUS SYSTEM:  Alert & Oriented X3, Good concentration; Motor Strength 5/5 B/L upper and lower extremities; DTRs 2+ intact and symmetric  CHEST/LUNG: Clear to ascultation  bilaterally; No rales, rhonchi, wheezing, or rubs  HEART: Regular rate and rhythm; No murmurs, rubs, or gallops  ABDOMEN: Soft, Nontender, Nondistended; Bowel sounds present  EXTREMITIES:  2+ Peripheral Pulses, No clubbing, cyanosis, or edema  LYMPH: No lymphadenopathy noted  SKIN: No rashes or lesions    LABS:                        12.9   5.62  )-----------( 253      ( 24 Oct 2023 07:59 )             39.0     10-24    139  |  105  |  10  ----------------------------<  230<H>  3.4<L>   |  27  |  0.74    Ca    8.8      24 Oct 2023 07:59  Phos  3.5     10-24  Mg     2.2     10-24        Urinalysis Basic - ( 24 Oct 2023 07:59 )    Color: x / Appearance: x / SG: x / pH: x  Gluc: 230 mg/dL / Ketone: x  / Bili: x / Urobili: x   Blood: x / Protein: x / Nitrite: x   Leuk Esterase: x / RBC: x / WBC x   Sq Epi: x / Non Sq Epi: x / Bacteria: x      CAPILLARY BLOOD GLUCOSE      POCT Blood Glucose.: 213 mg/dL (25 Oct 2023 16:39)  POCT Blood Glucose.: 206 mg/dL (25 Oct 2023 10:49)  POCT Blood Glucose.: 220 mg/dL (25 Oct 2023 07:37)  POCT Blood Glucose.: 258 mg/dL (24 Oct 2023 21:28)      RADIOLOGY & ADDITIONAL TESTS:    Imaging Personally Reviewed:  [ ] YES  [ ] NO    Consultant(s) Notes Reviewed:  [ ] YES  [ ] NO    Care Discussed with Consultants/Other Providers [ ] YES  [ ] NO

## 2023-10-25 NOTE — PROGRESS NOTE ADULT - SUBJECTIVE AND OBJECTIVE BOX
ANJELICA WHITLEY  MRN-[id number]    Follow Up:  cellulitis, bacteremia     Interval History: the pt was seen and examined earlier, no acute distress, no new complaints, feeling better. Pt is afebrile, RA, no leukocytosis.     PAST MEDICAL & SURGICAL HISTORY:  Diabetes mellitus      Hypertension      High cholesterol          ROS:    [ ] Unobtainable because:  [x ] All other systems negative    Constitutional: no fever, no chills  Head: no trauma  Eyes: no vision changes, no eye pain  ENT:  no sore throat, no rhinorrhea  Cardiovascular:  no chest pain, no palpitation  Respiratory:  no SOB, no cough  GI:  no abd pain, no vomiting, no diarrhea  urinary: no dysuria, no hematuria, no flank pain  musculoskeletal:  no joint pain, no joint swelling  skin:  no rash, left leg infection is less tender   neurology:  no headache, no seizure, no change in mental status  psych: no anxiety, no depression         Allergies  No Known Allergies        ANTIMICROBIALS:  cefTRIAXone   IVPB 2000 every 24 hours      OTHER MEDS:  acetaminophen     Tablet .. 650 milliGRAM(s) Oral every 6 hours PRN  aluminum hydroxide/magnesium hydroxide/simethicone Suspension 30 milliLiter(s) Oral every 4 hours PRN  aspirin  chewable 81 milliGRAM(s) Oral daily  atorvastatin 40 milliGRAM(s) Oral at bedtime  benzonatate 100 milliGRAM(s) Oral every 8 hours  dextrose 5%. 1000 milliLiter(s) IV Continuous <Continuous>  dextrose 5%. 1000 milliLiter(s) IV Continuous <Continuous>  dextrose 50% Injectable 12.5 Gram(s) IV Push once  dextrose 50% Injectable 25 Gram(s) IV Push once  dextrose 50% Injectable 25 Gram(s) IV Push once  dextrose Oral Gel 15 Gram(s) Oral once PRN  enoxaparin Injectable 40 milliGRAM(s) SubCutaneous every 12 hours  glucagon  Injectable 1 milliGRAM(s) IntraMuscular once  influenza   Vaccine 0.5 milliLiter(s) IntraMuscular once  insulin glargine Injectable (LANTUS) 8 Unit(s) SubCutaneous at bedtime  insulin lispro (ADMELOG) corrective regimen sliding scale   SubCutaneous three times a day before meals  lisinopril 40 milliGRAM(s) Oral daily  melatonin 3 milliGRAM(s) Oral at bedtime PRN  NIFEdipine XL 60 milliGRAM(s) Oral daily  ondansetron Injectable 4 milliGRAM(s) IV Push every 8 hours PRN  tamsulosin 0.4 milliGRAM(s) Oral at bedtime  traMADol 25 milliGRAM(s) Oral once      Vital Signs Last 24 Hrs  T(C): 37.3 (25 Oct 2023 10:49), Max: 37.3 (25 Oct 2023 10:49)  T(F): 99.2 (25 Oct 2023 10:49), Max: 99.2 (25 Oct 2023 10:49)  HR: 58 (25 Oct 2023 10:49) (58 - 101)  BP: 131/84 (25 Oct 2023 10:49) (131/84 - 148/95)  BP(mean): --  RR: 18 (25 Oct 2023 10:49) (18 - 18)  SpO2: 93% (25 Oct 2023 10:49) (93% - 97%)    Parameters below as of 25 Oct 2023 05:00  Patient On (Oxygen Delivery Method): room air        Physical Exam:  Constitutional: non-toxic, no distress, RA  HEAD/EYES: anicteric, no conjunctival injection  ENT:  supple, no thrush  Cardiovascular:   normal S1, S2, no murmur, +edema  Respiratory:  clear BS bilaterally, no wheezes, no rales  GI:  soft, non-tender, normal bowel sounds  :  no ferrera, no CVA tenderness  Musculoskeletal:  no synovitis, normal ROM  Neurologic: awake and alert, normal strength, no focal findings  Skin:  no rash, LLE is mildly warm, less tender, swelling and erythema with some improvement  Heme/Onc: no lymphadenopathy   Psychiatric:  awake, alert, appropriate mood    WBC Count: 5.62 K/uL (10-24 @ 07:59)  WBC Count: 9.96 K/uL (10-23 @ 10:55)  WBC Count: 16.12 K/uL (10-22 @ 05:10)  WBC Count: 21.11 K/uL (10-21 @ 18:35)                            12.9   5.62  )-----------( 253      ( 24 Oct 2023 07:59 )             39.0       10-24    139  |  105  |  10  ----------------------------<  230<H>  3.4<L>   |  27  |  0.74    Ca    8.8      24 Oct 2023 07:59  Phos  3.5     10-24  Mg     2.2     10-24        Urinalysis Basic - ( 24 Oct 2023 07:59 )    Color: x / Appearance: x / SG: x / pH: x  Gluc: 230 mg/dL / Ketone: x  / Bili: x / Urobili: x   Blood: x / Protein: x / Nitrite: x   Leuk Esterase: x / RBC: x / WBC x   Sq Epi: x / Non Sq Epi: x / Bacteria: x        Creatinine Trend: 0.74<--, 0.77<--, 0.69<--, 0.98<--      MICROBIOLOGY:  v  .Blood Blood-Peripheral  10-22-23   No growth at 48 Hours  --  --      .Blood Blood-Peripheral  10-22-23   No growth at 48 Hours  --  --      Clean Catch Clean Catch (Midstream)  10-21-23   <10,000 CFU/mL Normal Urogenital Lara  --  --      .Blood Blood-Peripheral  10-21-23   Growth in anaerobic bottle: Streptococcus agalactiae (Group B)  Direct identification is available within approximately 3-5  hours either by Blood Panel Multiplexed PCR or Direct  MALDI-TOF. Details: [email or web address]  --  Blood Culture PCR  Streptococcus agalactiae (Group B)      .Blood Blood-Peripheral  10-21-23   No growth at 72 Hours  --  --    Rapid RVP Result: NotDetec (10-21 @ 18:40)      D-Dimer Assay, Quantitative: 169 (10-21)    Procalcitonin, Serum: 6.02 (10-22-23 @ 01:10)    SARS-CoV-2: NotDetec (10-21-23 @ 18:40)  Rapid RVP Result: NotDetec (10-21-23 @ 18:40)    SARS-CoV-2: NotDetec (21 Oct 2023 18:40)    RADIOLOGY:

## 2023-10-26 LAB
ALBUMIN SERPL ELPH-MCNC: 2.6 G/DL — LOW (ref 3.3–5)
ALBUMIN SERPL ELPH-MCNC: 2.6 G/DL — LOW (ref 3.3–5)
ALP SERPL-CCNC: 64 U/L — SIGNIFICANT CHANGE UP (ref 40–120)
ALP SERPL-CCNC: 64 U/L — SIGNIFICANT CHANGE UP (ref 40–120)
ALT FLD-CCNC: 50 U/L — SIGNIFICANT CHANGE UP (ref 12–78)
ALT FLD-CCNC: 50 U/L — SIGNIFICANT CHANGE UP (ref 12–78)
ANION GAP SERPL CALC-SCNC: 4 MMOL/L — LOW (ref 5–17)
ANION GAP SERPL CALC-SCNC: 4 MMOL/L — LOW (ref 5–17)
AST SERPL-CCNC: 27 U/L — SIGNIFICANT CHANGE UP (ref 15–37)
AST SERPL-CCNC: 27 U/L — SIGNIFICANT CHANGE UP (ref 15–37)
B BURGDOR DNA SPEC QL NAA+PROBE: NEGATIVE — SIGNIFICANT CHANGE UP
B BURGDOR DNA SPEC QL NAA+PROBE: NEGATIVE — SIGNIFICANT CHANGE UP
BILIRUB SERPL-MCNC: 0.4 MG/DL — SIGNIFICANT CHANGE UP (ref 0.2–1.2)
BILIRUB SERPL-MCNC: 0.4 MG/DL — SIGNIFICANT CHANGE UP (ref 0.2–1.2)
BUN SERPL-MCNC: 9 MG/DL — SIGNIFICANT CHANGE UP (ref 7–23)
BUN SERPL-MCNC: 9 MG/DL — SIGNIFICANT CHANGE UP (ref 7–23)
CALCIUM SERPL-MCNC: 8.6 MG/DL — SIGNIFICANT CHANGE UP (ref 8.5–10.1)
CALCIUM SERPL-MCNC: 8.6 MG/DL — SIGNIFICANT CHANGE UP (ref 8.5–10.1)
CHLORIDE SERPL-SCNC: 105 MMOL/L — SIGNIFICANT CHANGE UP (ref 96–108)
CHLORIDE SERPL-SCNC: 105 MMOL/L — SIGNIFICANT CHANGE UP (ref 96–108)
CO2 SERPL-SCNC: 29 MMOL/L — SIGNIFICANT CHANGE UP (ref 22–31)
CO2 SERPL-SCNC: 29 MMOL/L — SIGNIFICANT CHANGE UP (ref 22–31)
CREAT SERPL-MCNC: 0.76 MG/DL — SIGNIFICANT CHANGE UP (ref 0.5–1.3)
CREAT SERPL-MCNC: 0.76 MG/DL — SIGNIFICANT CHANGE UP (ref 0.5–1.3)
EGFR: 110 ML/MIN/1.73M2 — SIGNIFICANT CHANGE UP
EGFR: 110 ML/MIN/1.73M2 — SIGNIFICANT CHANGE UP
GLUCOSE BLDC GLUCOMTR-MCNC: 202 MG/DL — HIGH (ref 70–99)
GLUCOSE BLDC GLUCOMTR-MCNC: 202 MG/DL — HIGH (ref 70–99)
GLUCOSE BLDC GLUCOMTR-MCNC: 243 MG/DL — HIGH (ref 70–99)
GLUCOSE BLDC GLUCOMTR-MCNC: 243 MG/DL — HIGH (ref 70–99)
GLUCOSE BLDC GLUCOMTR-MCNC: 244 MG/DL — HIGH (ref 70–99)
GLUCOSE BLDC GLUCOMTR-MCNC: 244 MG/DL — HIGH (ref 70–99)
GLUCOSE BLDC GLUCOMTR-MCNC: 360 MG/DL — HIGH (ref 70–99)
GLUCOSE BLDC GLUCOMTR-MCNC: 360 MG/DL — HIGH (ref 70–99)
GLUCOSE SERPL-MCNC: 262 MG/DL — HIGH (ref 70–99)
GLUCOSE SERPL-MCNC: 262 MG/DL — HIGH (ref 70–99)
HCT VFR BLD CALC: 39.8 % — SIGNIFICANT CHANGE UP (ref 39–50)
HCT VFR BLD CALC: 39.8 % — SIGNIFICANT CHANGE UP (ref 39–50)
HGB BLD-MCNC: 12.8 G/DL — LOW (ref 13–17)
HGB BLD-MCNC: 12.8 G/DL — LOW (ref 13–17)
MAGNESIUM SERPL-MCNC: 2.1 MG/DL — SIGNIFICANT CHANGE UP (ref 1.6–2.6)
MAGNESIUM SERPL-MCNC: 2.1 MG/DL — SIGNIFICANT CHANGE UP (ref 1.6–2.6)
MCHC RBC-ENTMCNC: 26.8 PG — LOW (ref 27–34)
MCHC RBC-ENTMCNC: 26.8 PG — LOW (ref 27–34)
MCHC RBC-ENTMCNC: 32.2 G/DL — SIGNIFICANT CHANGE UP (ref 32–36)
MCHC RBC-ENTMCNC: 32.2 G/DL — SIGNIFICANT CHANGE UP (ref 32–36)
MCV RBC AUTO: 83.4 FL — SIGNIFICANT CHANGE UP (ref 80–100)
MCV RBC AUTO: 83.4 FL — SIGNIFICANT CHANGE UP (ref 80–100)
NRBC # BLD: 0 /100 WBCS — SIGNIFICANT CHANGE UP (ref 0–0)
NRBC # BLD: 0 /100 WBCS — SIGNIFICANT CHANGE UP (ref 0–0)
PHOSPHATE SERPL-MCNC: 3.4 MG/DL — SIGNIFICANT CHANGE UP (ref 2.5–4.5)
PHOSPHATE SERPL-MCNC: 3.4 MG/DL — SIGNIFICANT CHANGE UP (ref 2.5–4.5)
PLATELET # BLD AUTO: 264 K/UL — SIGNIFICANT CHANGE UP (ref 150–400)
PLATELET # BLD AUTO: 264 K/UL — SIGNIFICANT CHANGE UP (ref 150–400)
POTASSIUM SERPL-MCNC: 3.8 MMOL/L — SIGNIFICANT CHANGE UP (ref 3.5–5.3)
POTASSIUM SERPL-MCNC: 3.8 MMOL/L — SIGNIFICANT CHANGE UP (ref 3.5–5.3)
POTASSIUM SERPL-SCNC: 3.8 MMOL/L — SIGNIFICANT CHANGE UP (ref 3.5–5.3)
POTASSIUM SERPL-SCNC: 3.8 MMOL/L — SIGNIFICANT CHANGE UP (ref 3.5–5.3)
PROT SERPL-MCNC: 7.3 GM/DL — SIGNIFICANT CHANGE UP (ref 6–8.3)
PROT SERPL-MCNC: 7.3 GM/DL — SIGNIFICANT CHANGE UP (ref 6–8.3)
RBC # BLD: 4.77 M/UL — SIGNIFICANT CHANGE UP (ref 4.2–5.8)
RBC # BLD: 4.77 M/UL — SIGNIFICANT CHANGE UP (ref 4.2–5.8)
RBC # FLD: 13.4 % — SIGNIFICANT CHANGE UP (ref 10.3–14.5)
RBC # FLD: 13.4 % — SIGNIFICANT CHANGE UP (ref 10.3–14.5)
SODIUM SERPL-SCNC: 138 MMOL/L — SIGNIFICANT CHANGE UP (ref 135–145)
SODIUM SERPL-SCNC: 138 MMOL/L — SIGNIFICANT CHANGE UP (ref 135–145)
WBC # BLD: 5.09 K/UL — SIGNIFICANT CHANGE UP (ref 3.8–10.5)
WBC # BLD: 5.09 K/UL — SIGNIFICANT CHANGE UP (ref 3.8–10.5)
WBC # FLD AUTO: 5.09 K/UL — SIGNIFICANT CHANGE UP (ref 3.8–10.5)
WBC # FLD AUTO: 5.09 K/UL — SIGNIFICANT CHANGE UP (ref 3.8–10.5)

## 2023-10-26 PROCEDURE — ZZZZZ: CPT

## 2023-10-26 PROCEDURE — 99232 SBSQ HOSP IP/OBS MODERATE 35: CPT

## 2023-10-26 RX ORDER — INSULIN GLARGINE 100 [IU]/ML
11 INJECTION, SOLUTION SUBCUTANEOUS AT BEDTIME
Refills: 0 | Status: DISCONTINUED | OUTPATIENT
Start: 2023-10-26 | End: 2023-10-27

## 2023-10-26 RX ORDER — INSULIN LISPRO 100/ML
2 VIAL (ML) SUBCUTANEOUS
Refills: 0 | Status: DISCONTINUED | OUTPATIENT
Start: 2023-10-26 | End: 2023-10-27

## 2023-10-26 RX ORDER — CEFTRIAXONE 500 MG/1
2 INJECTION, POWDER, FOR SOLUTION INTRAMUSCULAR; INTRAVENOUS
Qty: 20 | Refills: 0
Start: 2023-10-26 | End: 2023-11-04

## 2023-10-26 RX ADMIN — TAMSULOSIN HYDROCHLORIDE 0.4 MILLIGRAM(S): 0.4 CAPSULE ORAL at 21:48

## 2023-10-26 RX ADMIN — Medication 2: at 16:28

## 2023-10-26 RX ADMIN — Medication 81 MILLIGRAM(S): at 11:10

## 2023-10-26 RX ADMIN — Medication 2: at 07:57

## 2023-10-26 RX ADMIN — LISINOPRIL 40 MILLIGRAM(S): 2.5 TABLET ORAL at 06:13

## 2023-10-26 RX ADMIN — INSULIN GLARGINE 11 UNIT(S): 100 INJECTION, SOLUTION SUBCUTANEOUS at 21:48

## 2023-10-26 RX ADMIN — Medication 100 MILLIGRAM(S): at 14:15

## 2023-10-26 RX ADMIN — Medication 60 MILLIGRAM(S): at 06:11

## 2023-10-26 RX ADMIN — ENOXAPARIN SODIUM 40 MILLIGRAM(S): 100 INJECTION SUBCUTANEOUS at 06:13

## 2023-10-26 RX ADMIN — Medication 5: at 11:10

## 2023-10-26 RX ADMIN — Medication 100 MILLIGRAM(S): at 21:48

## 2023-10-26 RX ADMIN — ATORVASTATIN CALCIUM 40 MILLIGRAM(S): 80 TABLET, FILM COATED ORAL at 21:48

## 2023-10-26 RX ADMIN — CEFTRIAXONE 100 MILLIGRAM(S): 500 INJECTION, POWDER, FOR SOLUTION INTRAMUSCULAR; INTRAVENOUS at 15:25

## 2023-10-26 RX ADMIN — Medication 100 MILLIGRAM(S): at 06:11

## 2023-10-26 RX ADMIN — ENOXAPARIN SODIUM 40 MILLIGRAM(S): 100 INJECTION SUBCUTANEOUS at 18:14

## 2023-10-26 NOTE — PROGRESS NOTE ADULT - TIME BILLING
The necessity of the time spent during the encounter on this date of service was due to:   - Ordering, reviewing, and interpreting labs, testing, and imaging.  - Independently obtaining a review of systems and performing a physical exam  - Reviewing prior hospitalization and where necessary, outpatient records.  - Reviewing consultant recommendations/communicating with consultants  - Counselling and educating patient and family regarding interpretation of aforementioned items and plan of care.    Time-based billing (NON-critical care). Total minutes spent: 37
The necessity of the time spent during the encounter on this date of service was due to:   - Ordering, reviewing, and interpreting labs, testing, and imaging.  - Independently obtaining a review of systems and performing a physical exam  - Reviewing prior hospitalization and where necessary, outpatient records.  - Reviewing consultant recommendations/communicating with consultants  - Counselling and educating patient and family regarding interpretation of aforementioned items and plan of care.    Time-based billing (NON-critical care). Total minutes spent: 37

## 2023-10-26 NOTE — PROCEDURE NOTE - NSPROCDETAILS_GEN_ALL_CORE
vascular access confirmed on short axis, wire visualized on long axis, dilator advanced, catheter inserted smoothly with blood return/location identified, draped/prepped, sterile technique used/sterile dressing applied/sterile technique, catheter placed/supine position/ultrasound guidance

## 2023-10-26 NOTE — PROGRESS NOTE ADULT - REASON FOR ADMISSION
Sepsis secondary to suspected LLE cellulitis

## 2023-10-26 NOTE — PROGRESS NOTE ADULT - NS ATTEND AMEND GEN_ALL_CORE FT
I have reviewed all pertinent clinical information and agree with the NP's note.   continue ceftriaxone   blood cultures now negative  awaiting Transthoracic Echocardiogram   ideal to treat with IV for duration of treatment   if patient does not want IV antibiotics, would treat with IV as long as able then transition to PO Levaquin 750mg q24hrs for an additional 2 weeks from discharge  follow up in infectious disease office after discharge at end of antibiotics course     Discussed with Dr Marguerite Fritz, DO  Infectious Disease Attending  Reachable via Microsoft Teams or ID office: [phone]  After 5pm/weekends please call [phone] for all inquiries and new consults
I have reviewed all pertinent clinical information and agree with the NP's note.   continue antibiotics   follow up on Transthoracic Echocardiogram   if negative and patient amenable to IV antibiotics would treat for 2 weeks from negative blood cultures   good glycemic control  ensure up to date on vaccines such as influenza and TDAP and if not offer here     Prince Fritz, DO  Infectious Disease Attending  Reachable via Microsoft Teams or ID office: [phone]  After 5pm/weekends please call [phone] for all inquiries and new consults
I have reviewed all pertinent clinical information and agree with the NP's note.   continue ceftriaxone   Transthoracic Echocardiogram negative for IE   cultures negative and cellulitis improving   d/c planning-IV antibiotics for 2 weeks from negative blood cultures via midline    Prince Fritz DO  Infectious Disease Attending  Reachable via Microsoft Teams or ID office: [phone]  After 5pm/weekends please call [phone] for all inquiries and new consults

## 2023-10-26 NOTE — PROGRESS NOTE ADULT - PROVIDER SPECIALTY LIST ADULT
Infectious Disease
Internal Medicine
Infectious Disease
Internal Medicine
Infectious Disease
Hospitalist
Hospitalist

## 2023-10-26 NOTE — PROGRESS NOTE ADULT - SUBJECTIVE AND OBJECTIVE BOX
WHITLEYANJELICA  MRN-[id number]    Follow Up:  Cellulitis, bacteremia     Interval History: the pt was seen and examined earlier, no acute distress, no new complaints. Pt is afebrile, RA, no wbc, cellulitis is improving.     PAST MEDICAL & SURGICAL HISTORY:  Diabetes mellitus      Hypertension      High cholesterol          ROS:    [ ] Unobtainable because:  [x ] All other systems negative    Constitutional: no fever, no chills  Head: no trauma  Eyes: no vision changes, no eye pain  ENT:  no sore throat, no rhinorrhea  Cardiovascular:  no chest pain, no palpitation  Respiratory:  no SOB, no cough  GI:  no abd pain, no vomiting, no diarrhea  urinary: no dysuria, no hematuria, no flank pain  musculoskeletal:  no joint pain, no joint swelling  skin:  no rash, LE cellulitis is improving - less pain   neurology:  no headache, no seizure, no change in mental status  psych: no anxiety, no depression         Allergies  No Known Allergies        ANTIMICROBIALS:  cefTRIAXone   IVPB 2000 every 24 hours      OTHER MEDS:  acetaminophen     Tablet .. 650 milliGRAM(s) Oral every 6 hours PRN  aluminum hydroxide/magnesium hydroxide/simethicone Suspension 30 milliLiter(s) Oral every 4 hours PRN  aspirin  chewable 81 milliGRAM(s) Oral daily  atorvastatin 40 milliGRAM(s) Oral at bedtime  benzonatate 100 milliGRAM(s) Oral every 8 hours  dextrose 5%. 1000 milliLiter(s) IV Continuous <Continuous>  dextrose 5%. 1000 milliLiter(s) IV Continuous <Continuous>  dextrose 50% Injectable 25 Gram(s) IV Push once  dextrose 50% Injectable 12.5 Gram(s) IV Push once  dextrose 50% Injectable 25 Gram(s) IV Push once  dextrose Oral Gel 15 Gram(s) Oral once PRN  enoxaparin Injectable 40 milliGRAM(s) SubCutaneous every 12 hours  glucagon  Injectable 1 milliGRAM(s) IntraMuscular once  influenza   Vaccine 0.5 milliLiter(s) IntraMuscular once  insulin glargine Injectable (LANTUS) 8 Unit(s) SubCutaneous at bedtime  insulin lispro (ADMELOG) corrective regimen sliding scale   SubCutaneous three times a day before meals  lisinopril 40 milliGRAM(s) Oral daily  melatonin 3 milliGRAM(s) Oral at bedtime PRN  NIFEdipine XL 60 milliGRAM(s) Oral daily  ondansetron Injectable 4 milliGRAM(s) IV Push every 8 hours PRN  tamsulosin 0.4 milliGRAM(s) Oral at bedtime  traMADol 25 milliGRAM(s) Oral once      Vital Signs Last 24 Hrs  T(C): 36.8 (26 Oct 2023 15:52), Max: 37.2 (26 Oct 2023 00:01)  T(F): 98.3 (26 Oct 2023 15:52), Max: 98.9 (26 Oct 2023 00:01)  HR: 94 (26 Oct 2023 15:52) (85 - 112)  BP: 155/92 (26 Oct 2023 15:52) (145/87 - 169/102)  BP(mean): --  RR: 18 (26 Oct 2023 15:52) (18 - 20)  SpO2: 95% (26 Oct 2023 15:52) (95% - 96%)    Parameters below as of 26 Oct 2023 10:28  Patient On (Oxygen Delivery Method): room air        Physical Exam:  Constitutional: non-toxic, no distress, RA  HEAD/EYES: anicteric, no conjunctival injection  ENT:  supple, no thrush  Cardiovascular:  normal S1, S2, no murmur, +edema with improvement   Respiratory:  clear BS bilaterally, no wheezes, no rales  GI:  soft, non-tender, normal bowel sounds, obese   :  no ferrera, no CVA tenderness  Musculoskeletal:  no synovitis, normal ROM  Neurologic: awake and alert, normal strength, no focal findings  Skin:  no rash, LLE is less warm, less tender, erythema almost gone, swelling improved.   Heme/Onc: no lymphadenopathy   Psychiatric:  awake, alert, appropriate mood    WBC Count: 5.09 K/uL (10-26 @ 08:00)  WBC Count: 5.62 K/uL (10-24 @ 07:59)  WBC Count: 9.96 K/uL (10-23 @ 10:55)  WBC Count: 16.12 K/uL (10-22 @ 05:10)  WBC Count: 21.11 K/uL (10-21 @ 18:35)                            12.8   5.09  )-----------( 264      ( 26 Oct 2023 08:00 )             39.8       10-26    138  |  105  |  9   ----------------------------<  262<H>  3.8   |  29  |  0.76    Ca    8.6      26 Oct 2023 08:00  Phos  3.4     10-26  Mg     2.1     10-26    TPro  7.3  /  Alb  2.6<L>  /  TBili  0.4  /  DBili  x   /  AST  27  /  ALT  50  /  AlkPhos  64  10-26      Urinalysis Basic - ( 26 Oct 2023 08:00 )    Color: x / Appearance: x / SG: x / pH: x  Gluc: 262 mg/dL / Ketone: x  / Bili: x / Urobili: x   Blood: x / Protein: x / Nitrite: x   Leuk Esterase: x / RBC: x / WBC x   Sq Epi: x / Non Sq Epi: x / Bacteria: x        Creatinine Trend: 0.76<--, 0.74<--, 0.77<--, 0.69<--, 0.98<--      MICROBIOLOGY:  v  .Blood Blood-Peripheral  10-22-23   No growth at 72 Hours  --  --      .Blood Blood-Peripheral  10-22-23   No growth at 72 Hours  --  --      Clean Catch Clean Catch (Midstream)  10-21-23   <10,000 CFU/mL Normal Urogenital Lara  --  --      .Blood Blood-Peripheral  10-21-23   Growth in anaerobic bottle: Streptococcus agalactiae (Group B)  Direct identification is available within approximately 3-5  hours either by Blood Panel Multiplexed PCR or Direct  MALDI-TOF. Details: [email or web address]  --  Blood Culture PCR  Streptococcus agalactiae (Group B)      .Blood Blood-Peripheral  10-21-23   No growth at 4 days  --  --          Rapid RVP Result: NotDetec (10-21 @ 18:40)              D-Dimer Assay, Quantitative: 169 (10-21)    Procalcitonin, Serum: 6.02 (10-22-23 @ 01:10)    SARS-CoV-2: NotDetec (10-21-23 @ 18:40)  Rapid RVP Result: NotDetec (10-21-23 @ 18:40)    SARS-CoV-2: NotDetec (21 Oct 2023 18:40)    RADIOLOGY:    < from: TTE Echo Complete w/o Contrast w/ Doppler (10.25.23 @ 12:56) >  Summary:   1. Left ventricular ejection fraction, by visual estimation, is 55 to   60%.   2. Normal global left ventricular systolic function.   3. No evidence of mitral valve regurgitation.   4. Mild tricuspid regurgitation.   5. No subcostal views for interpretation.   6. No evidence of any vegetations, limited visualization of valve   leaflets.      [id number] Fausto Mckeon MD, [facility]  Electronically signed on 10/26/2023 at 11:32:24 AM    < end of copied text >       WHITLEYANJELICA  MRN-[id number]    Follow Up:  Cellulitis, bacteremia     Interval History: the pt was seen and examined earlier, no acute distress, no new complaints. Pt is afebrile, RA, no wbc, cellulitis is improving.     PAST MEDICAL & SURGICAL HISTORY:  Diabetes mellitus      Hypertension      High cholesterol          ROS:    [ ] Unobtainable because:  [x ] All other systems negative    Constitutional: no fever, no chills  Head: no trauma  Eyes: no vision changes, no eye pain  ENT:  no sore throat, no rhinorrhea  Cardiovascular:  no chest pain, no palpitation  Respiratory:  no SOB, no cough  GI:  no abd pain, no vomiting, no diarrhea  urinary: no dysuria, no hematuria, no flank pain  musculoskeletal:  no joint pain, no joint swelling  skin:  no rash, LE cellulitis is improving - less pain   neurology:  no headache, no seizure, no change in mental status  psych: no anxiety, no depression         Allergies  No Known Allergies        ANTIMICROBIALS:  cefTRIAXone   IVPB 2000 every 24 hours      OTHER MEDS:  acetaminophen     Tablet .. 650 milliGRAM(s) Oral every 6 hours PRN  aluminum hydroxide/magnesium hydroxide/simethicone Suspension 30 milliLiter(s) Oral every 4 hours PRN  aspirin  chewable 81 milliGRAM(s) Oral daily  atorvastatin 40 milliGRAM(s) Oral at bedtime  benzonatate 100 milliGRAM(s) Oral every 8 hours  dextrose 5%. 1000 milliLiter(s) IV Continuous <Continuous>  dextrose 5%. 1000 milliLiter(s) IV Continuous <Continuous>  dextrose 50% Injectable 25 Gram(s) IV Push once  dextrose 50% Injectable 12.5 Gram(s) IV Push once  dextrose 50% Injectable 25 Gram(s) IV Push once  dextrose Oral Gel 15 Gram(s) Oral once PRN  enoxaparin Injectable 40 milliGRAM(s) SubCutaneous every 12 hours  glucagon  Injectable 1 milliGRAM(s) IntraMuscular once  influenza   Vaccine 0.5 milliLiter(s) IntraMuscular once  insulin glargine Injectable (LANTUS) 8 Unit(s) SubCutaneous at bedtime  insulin lispro (ADMELOG) corrective regimen sliding scale   SubCutaneous three times a day before meals  lisinopril 40 milliGRAM(s) Oral daily  melatonin 3 milliGRAM(s) Oral at bedtime PRN  NIFEdipine XL 60 milliGRAM(s) Oral daily  ondansetron Injectable 4 milliGRAM(s) IV Push every 8 hours PRN  tamsulosin 0.4 milliGRAM(s) Oral at bedtime  traMADol 25 milliGRAM(s) Oral once      Vital Signs Last 24 Hrs  T(C): 36.8 (26 Oct 2023 15:52), Max: 37.2 (26 Oct 2023 00:01)  T(F): 98.3 (26 Oct 2023 15:52), Max: 98.9 (26 Oct 2023 00:01)  HR: 94 (26 Oct 2023 15:52) (85 - 112)  BP: 155/92 (26 Oct 2023 15:52) (145/87 - 169/102)  BP(mean): --  RR: 18 (26 Oct 2023 15:52) (18 - 20)  SpO2: 95% (26 Oct 2023 15:52) (95% - 96%)    Parameters below as of 26 Oct 2023 10:28  Patient On (Oxygen Delivery Method): room air        Physical Exam:  Constitutional: non-toxic, no distress, RA  HEAD/EYES: anicteric, no conjunctival injection  ENT:  supple, no thrush  Cardiovascular:  normal S1, S2, no murmur, +edema with improvement   Respiratory:  clear BS bilaterally, no wheezes, no rales  GI:  soft, non-tender, normal bowel sounds, obese   :  no ferrera, no CVA tenderness  Musculoskeletal:  no synovitis, normal ROM  Neurologic: awake and alert, normal strength, no focal findings  Skin:  no rash, LLE is less warm, less tender, erythema almost gone, swelling improved.   Heme/Onc: no lymphadenopathy   Psychiatric:  awake, alert, appropriate mood    WBC Count: 5.09 K/uL (10-26 @ 08:00)  WBC Count: 5.62 K/uL (10-24 @ 07:59)  WBC Count: 9.96 K/uL (10-23 @ 10:55)  WBC Count: 16.12 K/uL (10-22 @ 05:10)  WBC Count: 21.11 K/uL (10-21 @ 18:35)                            12.8   5.09  )-----------( 264      ( 26 Oct 2023 08:00 )             39.8       10-26    138  |  105  |  9   ----------------------------<  262<H>  3.8   |  29  |  0.76    Ca    8.6      26 Oct 2023 08:00  Phos  3.4     10-26  Mg     2.1     10-26    TPro  7.3  /  Alb  2.6<L>  /  TBili  0.4  /  DBili  x   /  AST  27  /  ALT  50  /  AlkPhos  64  10-26      Urinalysis Basic - ( 26 Oct 2023 08:00 )    Color: x / Appearance: x / SG: x / pH: x  Gluc: 262 mg/dL / Ketone: x  / Bili: x / Urobili: x   Blood: x / Protein: x / Nitrite: x   Leuk Esterase: x / RBC: x / WBC x   Sq Epi: x / Non Sq Epi: x / Bacteria: x        Creatinine Trend: 0.76<--, 0.74<--, 0.77<--, 0.69<--, 0.98<--      MICROBIOLOGY:  v  .Blood Blood-Peripheral  10-22-23   No growth at 72 Hours  --  --      .Blood Blood-Peripheral  10-22-23   No growth at 72 Hours  --  --      Clean Catch Clean Catch (Midstream)  10-21-23   <10,000 CFU/mL Normal Urogenital Lara  --  --      .Blood Blood-Peripheral  10-21-23   Growth in anaerobic bottle: Streptococcus agalactiae (Group B)  Direct identification is available within approximately 3-5  hours either by Blood Panel Multiplexed PCR or Direct  MALDI-TOF. Details: [email or web address]  --  Blood Culture PCR  Streptococcus agalactiae (Group B)      .Blood Blood-Peripheral  10-21-23   No growth at 4 days  --  --          Rapid RVP Result: NotDetec (10-21 @ 18:40)      D-Dimer Assay, Quantitative: 169 (10-21)    Procalcitonin, Serum: 6.02 (10-22-23 @ 01:10)    SARS-CoV-2: NotDetec (10-21-23 @ 18:40)  Rapid RVP Result: NotDetec (10-21-23 @ 18:40)    SARS-CoV-2: NotDetec (21 Oct 2023 18:40)    RADIOLOGY:    < from: TTE Echo Complete w/o Contrast w/ Doppler (10.25.23 @ 12:56) >  Summary:   1. Left ventricular ejection fraction, by visual estimation, is 55 to   60%.   2. Normal global left ventricular systolic function.   3. No evidence of mitral valve regurgitation.   4. Mild tricuspid regurgitation.   5. No subcostal views for interpretation.   6. No evidence of any vegetations, limited visualization of valve   leaflets.      [id number] Fausto Mckeon MD, [facility]  Electronically signed on 10/26/2023 at 11:32:24 AM    < end of copied text >

## 2023-10-26 NOTE — PROGRESS NOTE ADULT - ASSESSMENT
Marshall Harding is a 48 year old male with PMHx of HTN, HLD, and NIDDM2 who presented to the ED on 10/21/23 for complaints of cough and admitted for sepsis secondary to suspected LLE cellulitis.    Sepsis POA secondary to suspected LLE cellulitis complicated by strep agalact bactereia  Complaints of cough x 3 days, associated headache  Reports recent travel to [place], admits to mosquito bites while there, denies retro-orbital pain (within 6 months, no bite marks0  Physical exam with LLE erythematous, tender to palpation, warm to touch, LLE slightly larger than RLE  Tmax 103.1, , RR 25, WBC 21.11K on admission  U/A unremarkable for infection, RVP negative  CT A/P without signs of infection  CT Lower extremity ++ cellulitis  arterial and venous duplex benign  blood cultures ++ strep  plan;  stop doxy  start rocephin 2 grams  ID consult  echo  repeat cultures    Lactic acidosis secondary to above  Lactic acid 2.8 on admission  S/p 3.5L NS bolus in the ED  F/u serial lactates    Generalized weakness, suspect due to infectious etiology  PT/OT ordered      Chronic medical conditions:  HTN: PTA lisinopril, nifedipine  HLD: PTA simvastatin  NIDDM2 with hyperglycemia: POC qac and qhs, SSI, PTA metformin, trulicity, acarbose, glipizide held, blood glucose goal < 180, f/u A1c    
Marshall Harding is a 48 year old male with PMHx of HTN, HLD, and NIDDM2 who presented to the ED on 10/21/23 for complaints of cough and admitted for sepsis secondary to suspected LLE cellulitis.    Sepsis POA secondary to suspected LLE cellulitis complicated by strep agalact bactereia  Complaints of cough x 3 days, associated headache  Reports recent travel to [place], admits to mosquito bites while there, denies retro-orbital pain (within 6 months, no bite marks0  Physical exam with LLE erythematous, tender to palpation, warm to touch, LLE slightly larger than RLE  Tmax 103.1, , RR 25, WBC 21.11K on admission  U/A unremarkable for infection, RVP negative  CT A/P without signs of infection  CT Lower extremity ++ cellulitis  arterial and venous duplex benign  blood cultures ++ strep :: sensitive to levoquin and rocephin  ID consult  echo ordered  repeat cx negative (as of 10.22)  plan:  midline: LIANNE : patient agreeable    Lactic acidosis secondary to above  Lactic acid 2.8 on admission  S/p 3.5L NS bolus in the ED  F/u serial lactates    Generalized weakness, suspect due to infectious etiology  PT/OT ordered      Chronic medical conditions:  HTN: PTA lisinopril, nifedipine  HLD: PTA simvastatin  NIDDM2 with hyperglycemia: POC qac and qhs, SSI, PTA metformin, trulicity, acarbose, glipizide held, blood glucose goal < 180, f/u A1c: patient agreeing to insulin    
Marshall Harding is a 48 year old male with PMHx of HTN, HLD, and NIDDM2 who presented to the ED on 10/21/23 for complaints of cough.  In the ED, Tmax 103.1, HR as elevated as 138, RR as elevated as 25. WBC 21.11K, blood glucose 316. Lactic acid 2.8. RVP negative.   U/A with ketonuria, glucosuria.   CT A/P There are several sclerotic bone lesions in the pelvis. Recommend correlation with PSA to rule out prostate cancer and blastic metastatic disease.  Blood cultures with group B Streptococcus   LLE cellulitis present on exam   likely source of bacteremia from cellulitis   patient was coughing but his CT chest as well as lung exam is clear   unclear sign of pulmonary infection but perhaps manifestation of sepsis   antibiotics would likely cover CAP     10/24: cellulitis with some improvement, site is not as tender on today's exam. Pt is afebrile since 10/22, RA no wbc, repeat BCs with no growth to date, TTE is pending. Ceftriaxone IV continued.     Plan:  continue ceftriaxone 2g q24hrs   follow repeat blood cultures- preliminary NGTD  ideal to treat for a minimum of 2 weeks and sometimes extend therapy to 4 weeks   Transthoracic Echocardiogram is pending   no placement of midline or picc line yet   Uncontrolled DM with Hgb A1c 10.6, control his glucose levels and avoid hypoglycemic episodes   trend wbc   trend fever  send PSA or consider biopsy of lesions - PSA ordered for morning labs     Discussed with Dr. Fritz  Discussed with the pt and his mother at the bedside 
Marshall Harding is a 48 year old male with PMHx of HTN, HLD, and NIDDM2 who presented to the ED on 10/21/23 for complaints of cough.  In the ED, Tmax 103.1, HR as elevated as 138, RR as elevated as 25. WBC 21.11K, blood glucose 316. Lactic acid 2.8. RVP negative.   U/A with ketonuria, glucosuria.   CT A/P There are several sclerotic bone lesions in the pelvis. Recommend correlation with PSA to rule out prostate cancer and blastic metastatic disease.  Blood cultures with group B Streptococcus   LLE cellulitis present on exam   likely source of bacteremia from cellulitis   patient was coughing but his CT chest as well as lung exam is clear   unclear sign of pulmonary infection but perhaps manifestation of sepsis   antibiotics would likely cover CAP     10/24: cellulitis with some improvement, site is not as tender on today's exam. Pt is afebrile since 10/22, RA no wbc, repeat BCs with no growth to date, TTE is pending. Ceftriaxone IV continued.   Attending addendum:  I have reviewed all pertinent clinical information and agree with the NP's note.   continue ceftriaxone   blood cultures now negative  awaiting Transthoracic Echocardiogram   ideal to treat with IV for duration of treatment   if patient does not want IV antibiotics, would treat with IV as long as able then transition to PO Levaquin 750mg q24hrs for an additional 2 weeks from discharge  follow up in infectious disease office after discharge at end of antibiotics course     10/25: remains afebrile, RA, no wbc, Cr ok, HIV screen negative, repeat BCs remain with no growth to date, TTE performed and pending reading, cellulitis is improving, Ceftriaxone continued.     Plan:  continue ceftriaxone 2g q24hrs   follow repeat blood cultures- NGTD  Transthoracic Echocardiogram performed, pending reading   no placement of midline or picc line yet   Uncontrolled DM with Hgb A1c 10.6, control his glucose levels and avoid hypoglycemic episodes   trend wbc   trend fever  ideal to treat with IV for duration of treatment   if patient does not want IV antibiotics, would treat with IV as long as able then transition to PO Levaquin 750mg q24hrs for an additional 2 weeks from discharge  follow up in infectious disease office after discharge at end of antibiotics course     Discussed with Dr. Fritz  
Marshall Harding is a 48 year old male with PMHx of HTN, HLD, and NIDDM2 who presented to the ED on 10/21/23 for complaints of cough.  In the ED, Tmax 103.1, HR as elevated as 138, RR as elevated as 25. WBC 21.11K, blood glucose 316. Lactic acid 2.8. RVP negative.   U/A with ketonuria, glucosuria.   CT A/P There are several sclerotic bone lesions in the pelvis. Recommend correlation with PSA to rule out prostate cancer and blastic metastatic disease.  Blood cultures with group B Streptococcus   LLE cellulitis present on exam   likely source of bacteremia from cellulitis   patient was coughing but his CT chest as well as lung exam is clear   unclear sign of pulmonary infection but perhaps manifestation of sepsis   antibiotics would likely cover CAP     10/24: cellulitis with some improvement, site is not as tender on today's exam. Pt is afebrile since 10/22, RA no wbc, repeat BCs with no growth to date, TTE is pending. Ceftriaxone IV continued.   Attending addendum:  I have reviewed all pertinent clinical information and agree with the NP's note.   continue ceftriaxone   blood cultures now negative  awaiting Transthoracic Echocardiogram   ideal to treat with IV for duration of treatment   if patient does not want IV antibiotics, would treat with IV as long as able then transition to PO Levaquin 750mg q24hrs for an additional 2 weeks from discharge  follow up in infectious disease office after discharge at end of antibiotics course     10/25: remains afebrile, RA, no wbc, Cr ok, HIV screen negative, repeat BCs remain with no growth to date, TTE performed and pending reading, cellulitis is improving, Ceftriaxone continued.   Attending addendum:  I have reviewed all pertinent clinical information and agree with the NP's note.   continue antibiotics   follow up on Transthoracic Echocardiogram   if negative and patient amenable to IV antibiotics would treat for 2 weeks from negative blood cultures   good glycemic control  ensure up to date on vaccines such as influenza and TDAP and if not offer here     10/26: no fevers, RA, no wbc, repeat BCs remain with no growth to date, TTE - no vegetation, cellulitis with significant improvement, pt needs two weeks of IV abx from negative BCs, discussed with the pt, Rx provided. Discussed with the pt risks and benefits of a midline and prolonged course of abx, all question answered to the best of my ability.     Plan:  continue ceftriaxone 2g q24hrs   follow repeat blood cultures- NGTD  Transthoracic Echocardiogram - no vegetation   ok to place a midline  Uncontrolled DM with Hgb A1c 10.6, control his glucose levels and avoid hypoglycemic episodes   trend wbc   trend fever  follow up in infectious disease office after discharge at end of antibiotics course     when ready for discharge:  place a midline  continue ceftriaxone 2 g IV daily - last dose 11/4/2023 - Rx provided  one time lab work: cbc with diff, cmp - fax to Dr. Fritz at [phone]  follow up with ID in the office:  [address] [place] [address] [place]. [place], [place] [id number] [phone]  remove midline upon completion of the course of abx     Discussed with Dr. Fritz  Discussed with Dr. Cruz  Discussed with the    
Marshall Harding is a 48 year old male with PMHx of HTN, HLD, and NIDDM2 who presented to the ED on 10/21/23 for complaints of cough and admitted for sepsis secondary to suspected LLE cellulitis.    Sepsis POA secondary to suspected LLE cellulitis complicated by strep agalact bactereia  blood cultures ++ strep :: sensitive to levoquin and rocephin  ID consult appreciated   echo ordered and done today   repeat cx negative (as of 10.22)  plan:  midline: LIANNE : patient agreeable    Lactic acidosis secondary to above  Lactic acid 2.8 on admission  resolved     Generalized weakness,  no skilled PT/ot needs         Chronic medical conditions:  HTN: PTA lisinopril, nifedipine  HLD: PTA simvastatin  NIDDM2 with hyperglycemia: POC qac and qhs, SSI, PTA metformin, trulicity, acarbose, glipizide held, blood glucose goal < 180, f/u A1c: patient agreeing to insulin    
Marshall Harding is a 48 year old male with PMHx of HTN, HLD, and NIDDM2 who presented to the ED on 10/21/23 for complaints of cough and admitted for sepsis secondary to suspected LLE cellulitis.    Sepsis POA secondary to suspected LLE cellulitis complicated by strep agalact bactereia  blood cultures ++ strep :: sensitive to levoquin and rocephin  ID consult appreciated   echo ordered and done today   repeat cx negative (as of 10.22)  plan:  midline: LIANNE : patient agreeable    Lactic acidosis secondary to above  Lactic acid 2.8 on admission  resolved     Generalized weakness,  no skilled PT/ot needs         Chronic medical conditions:  HTN: PTA lisinopril, nifedipine  HLD: PTA simvastatin  NIDDM2 with hyperglycemia: POC qac and qhs, SSI, PTA metformin, trulicity, acarbose, glipizide held, blood glucose goal < 180, f/u A1c: patient agreeing to insulin      DC home in AM with Mildlinbe after 2 gram dose of ceftriaxone

## 2023-10-26 NOTE — PROGRESS NOTE ADULT - PROBLEM SELECTOR PROBLEM 6
Diabetes mellitus with hyperglycemia, without long-term current use of insulin
Diabetes mellitus with hyperglycemia, without long-term current use of insulin

## 2023-10-26 NOTE — PROGRESS NOTE ADULT - SUBJECTIVE AND OBJECTIVE BOX
HPI:  Marshall Harding is a 48 year old male with PMHx of HTN, HLD, and NIDDM2 who presented to the ED on 10/21/23 for complaints of cough.    Patient reports he has been having cough for the past three days. Sometimes, it is with clear sputum and other times, it is green/yellow in color. Tried taking Robitussin with mild alleviation of cough. Denies sick contacts. Recent travel to [place] and states he got bitten by mosquitoes while there. Associated headache and left leg tenderness. No trauma to the left leg. Denies neck pain, chest pain, shortness of breath, abdominal pain, diarrhea, or constipation. Of note, since he has not felt well for the past few days, he did not take any of his diabetic medications.    In the ED, Tmax 103.1, HR as elevated as 138, RR as elevated as 25. WBC 21.11K, blood glucose 316. Lactic acid 2.8. RVP negative. U/A with ketonuria, glucosuria. CT A/P without signs of infection. CT chest performed, official read not in but as per ER physician, Dr. Torres, she states she spoke to radiologist who states CT chest unremarkable. Received 3.5L NS bolus, ceftriaxone, and azithromycin.  (22 Oct 2023 01:58)    Patient is a 48y old  Male who presents with a chief complaint of Sepsis secondary to suspected LLE cellulitis (26 Oct 2023 16:29)      INTERVAL HPI/OVERNIGHT EVENTS:    MEDICATIONS  (STANDING):  aspirin  chewable 81 milliGRAM(s) Oral daily  atorvastatin 40 milliGRAM(s) Oral at bedtime  benzonatate 100 milliGRAM(s) Oral every 8 hours  cefTRIAXone   IVPB 2000 milliGRAM(s) IV Intermittent every 24 hours  dextrose 5%. 1000 milliLiter(s) (50 mL/Hr) IV Continuous <Continuous>  dextrose 5%. 1000 milliLiter(s) (100 mL/Hr) IV Continuous <Continuous>  dextrose 50% Injectable 12.5 Gram(s) IV Push once  dextrose 50% Injectable 25 Gram(s) IV Push once  dextrose 50% Injectable 25 Gram(s) IV Push once  enoxaparin Injectable 40 milliGRAM(s) SubCutaneous every 12 hours  glucagon  Injectable 1 milliGRAM(s) IntraMuscular once  influenza   Vaccine 0.5 milliLiter(s) IntraMuscular once  insulin glargine Injectable (LANTUS) 11 Unit(s) SubCutaneous at bedtime  insulin lispro (ADMELOG) corrective regimen sliding scale   SubCutaneous three times a day before meals  insulin lispro Injectable (ADMELOG) 2 Unit(s) SubCutaneous three times a day before meals  lisinopril 40 milliGRAM(s) Oral daily  NIFEdipine XL 60 milliGRAM(s) Oral daily  tamsulosin 0.4 milliGRAM(s) Oral at bedtime  traMADol 25 milliGRAM(s) Oral once    MEDICATIONS  (PRN):  acetaminophen     Tablet .. 650 milliGRAM(s) Oral every 6 hours PRN Temp greater or equal to 38C (100.4F), Mild Pain (1 - 3)  aluminum hydroxide/magnesium hydroxide/simethicone Suspension 30 milliLiter(s) Oral every 4 hours PRN Dyspepsia  dextrose Oral Gel 15 Gram(s) Oral once PRN Blood Glucose LESS THAN 70 milliGRAM(s)/deciliter  melatonin 3 milliGRAM(s) Oral at bedtime PRN Insomnia  ondansetron Injectable 4 milliGRAM(s) IV Push every 8 hours PRN Nausea and/or Vomiting      Allergies    No Known Allergies    Intolerances        REVIEW OF SYSTEMS:  CONSTITUTIONAL: No fever, weight loss, or fatigue  EYES: No eye pain, visual disturbances, or discharge  ENMT:  No difficulty hearing, tinnitus, vertigo; No sinus or throat pain  NECK: No pain or stiffness  BREASTS: No pain, masses, or nipple discharge  RESPIRATORY: No cough, wheezing, chills or hemoptysis; No shortness of breath  CARDIOVASCULAR: No chest pain, palpitations, dizziness, or leg swelling  GASTROINTESTINAL: No abdominal or epigastric pain. No nausea, vomiting, or hematemesis; No diarrhea or constipation. No melena or hematochezia.  GENITOURINARY: No dysuria, frequency, hematuria, or incontinence  NEUROLOGICAL: No headaches, memory loss, loss of strength, numbness, or tremors  SKIN: No itching, burning, rashes, or lesions   LYMPH NODES: No enlarged glands  ENDOCRINE: No heat or cold intolerance; No hair loss  MUSCULOSKELETAL: No joint pain or swelling; No muscle, back, or extremity pain  PSYCHIATRIC: No depression, anxiety, mood swings, or difficulty sleeping  HEME/LYMPH: No easy bruising, or bleeding gums  ALLERGY AND IMMUNOLOGIC: No hives or eczema    Vital Signs Last 24 Hrs  T(C): 36.8 (26 Oct 2023 15:52), Max: 37.2 (26 Oct 2023 00:01)  T(F): 98.3 (26 Oct 2023 15:52), Max: 98.9 (26 Oct 2023 00:01)  HR: 94 (26 Oct 2023 15:52) (85 - 100)  BP: 155/92 (26 Oct 2023 15:52) (145/87 - 169/102)  BP(mean): --  RR: 18 (26 Oct 2023 15:52) (18 - 20)  SpO2: 95% (26 Oct 2023 15:52) (95% - 96%)    Parameters below as of 26 Oct 2023 15:52  Patient On (Oxygen Delivery Method): room air        PHYSICAL EXAM:  GENERAL: NAD, well-groomed, well-developed  HEAD:  Atraumatic, Normocephalic  EYES: EOMI, PERRLA, conjunctiva and sclera clear  ENMT: No tonsillar erythema, exudates, or enlargement; Moist mucous membranes, Good dentition, No lesions  NECK: Supple, No JVD, Normal thyroid  NERVOUS SYSTEM:  Alert & Oriented X3, Good concentration; Motor Strength 5/5 B/L upper and lower extremities; DTRs 2+ intact and symmetric  CHEST/LUNG: Clear to ascultation  bilaterally; No rales, rhonchi, wheezing, or rubs  HEART: Regular rate and rhythm; No murmurs, rubs, or gallops  ABDOMEN: Soft, Nontender, Nondistended; Bowel sounds present  EXTREMITIES:  2+ Peripheral Pulses, No clubbing, cyanosis, or edema  LYMPH: No lymphadenopathy noted  SKIN: No rashes or lesions    LABS:                        12.8   5.09  )-----------( 264      ( 26 Oct 2023 08:00 )             39.8     10-26    138  |  105  |  9   ----------------------------<  262<H>  3.8   |  29  |  0.76    Ca    8.6      26 Oct 2023 08:00  Phos  3.4     10-26  Mg     2.1     10-26    TPro  7.3  /  Alb  2.6<L>  /  TBili  0.4  /  DBili  x   /  AST  27  /  ALT  50  /  AlkPhos  64  10-26      Urinalysis Basic - ( 26 Oct 2023 08:00 )    Color: x / Appearance: x / SG: x / pH: x  Gluc: 262 mg/dL / Ketone: x  / Bili: x / Urobili: x   Blood: x / Protein: x / Nitrite: x   Leuk Esterase: x / RBC: x / WBC x   Sq Epi: x / Non Sq Epi: x / Bacteria: x      CAPILLARY BLOOD GLUCOSE      POCT Blood Glucose.: 202 mg/dL (26 Oct 2023 16:26)  POCT Blood Glucose.: 360 mg/dL (26 Oct 2023 11:07)  POCT Blood Glucose.: 244 mg/dL (26 Oct 2023 07:29)  POCT Blood Glucose.: 346 mg/dL (25 Oct 2023 22:24)      RADIOLOGY & ADDITIONAL TESTS:    Imaging Personally Reviewed:  [ ] YES  [ ] NO    Consultant(s) Notes Reviewed:  [ ] YES  [ ] NO    Care Discussed with Consultants/Other Providers [ ] YES  [ ] NO

## 2023-10-27 VITALS
RESPIRATION RATE: 18 BRPM | SYSTOLIC BLOOD PRESSURE: 153 MMHG | DIASTOLIC BLOOD PRESSURE: 89 MMHG | TEMPERATURE: 98 F | HEART RATE: 96 BPM | OXYGEN SATURATION: 94 %

## 2023-10-27 LAB
CULTURE RESULTS: SIGNIFICANT CHANGE UP
CULTURE RESULTS: SIGNIFICANT CHANGE UP
GLUCOSE BLDC GLUCOMTR-MCNC: 220 MG/DL — HIGH (ref 70–99)
GLUCOSE BLDC GLUCOMTR-MCNC: 220 MG/DL — HIGH (ref 70–99)
GLUCOSE BLDC GLUCOMTR-MCNC: 312 MG/DL — HIGH (ref 70–99)
GLUCOSE BLDC GLUCOMTR-MCNC: 312 MG/DL — HIGH (ref 70–99)
SPECIMEN SOURCE: SIGNIFICANT CHANGE UP
SPECIMEN SOURCE: SIGNIFICANT CHANGE UP

## 2023-10-27 PROCEDURE — 99239 HOSP IP/OBS DSCHRG MGMT >30: CPT

## 2023-10-27 RX ORDER — TAMSULOSIN HYDROCHLORIDE 0.4 MG/1
1 CAPSULE ORAL
Qty: 30 | Refills: 0
Start: 2023-10-27 | End: 2023-11-25

## 2023-10-27 RX ADMIN — ENOXAPARIN SODIUM 40 MILLIGRAM(S): 100 INJECTION SUBCUTANEOUS at 05:41

## 2023-10-27 RX ADMIN — Medication 81 MILLIGRAM(S): at 11:18

## 2023-10-27 RX ADMIN — CEFTRIAXONE 100 MILLIGRAM(S): 500 INJECTION, POWDER, FOR SOLUTION INTRAMUSCULAR; INTRAVENOUS at 11:17

## 2023-10-27 RX ADMIN — Medication 100 MILLIGRAM(S): at 05:41

## 2023-10-27 RX ADMIN — Medication 60 MILLIGRAM(S): at 05:41

## 2023-10-27 RX ADMIN — Medication 2: at 08:20

## 2023-10-27 RX ADMIN — Medication 2 UNIT(S): at 08:21

## 2023-10-27 RX ADMIN — Medication 2 UNIT(S): at 11:17

## 2023-10-27 RX ADMIN — LISINOPRIL 40 MILLIGRAM(S): 2.5 TABLET ORAL at 05:41

## 2023-10-27 RX ADMIN — Medication 4: at 11:17

## 2023-10-27 NOTE — DIETITIAN INITIAL EVALUATION ADULT - PERTINENT MEDS FT
MEDICATIONS  (STANDING):  aspirin  chewable 81 milliGRAM(s) Oral daily  atorvastatin 40 milliGRAM(s) Oral at bedtime  benzonatate 100 milliGRAM(s) Oral every 8 hours  cefTRIAXone   IVPB 2000 milliGRAM(s) IV Intermittent every 24 hours  dextrose 5%. 1000 milliLiter(s) (50 mL/Hr) IV Continuous <Continuous>  dextrose 5%. 1000 milliLiter(s) (100 mL/Hr) IV Continuous <Continuous>  dextrose 50% Injectable 12.5 Gram(s) IV Push once  dextrose 50% Injectable 25 Gram(s) IV Push once  dextrose 50% Injectable 25 Gram(s) IV Push once  enoxaparin Injectable 40 milliGRAM(s) SubCutaneous every 12 hours  glucagon  Injectable 1 milliGRAM(s) IntraMuscular once  influenza   Vaccine 0.5 milliLiter(s) IntraMuscular once  insulin glargine Injectable (LANTUS) 11 Unit(s) SubCutaneous at bedtime  insulin lispro (ADMELOG) corrective regimen sliding scale   SubCutaneous three times a day before meals  insulin lispro Injectable (ADMELOG) 2 Unit(s) SubCutaneous three times a day before meals  lisinopril 40 milliGRAM(s) Oral daily  NIFEdipine XL 60 milliGRAM(s) Oral daily  tamsulosin 0.4 milliGRAM(s) Oral at bedtime  traMADol 25 milliGRAM(s) Oral once    MEDICATIONS  (PRN):  acetaminophen     Tablet .. 650 milliGRAM(s) Oral every 6 hours PRN Temp greater or equal to 38C (100.4F), Mild Pain (1 - 3)  aluminum hydroxide/magnesium hydroxide/simethicone Suspension 30 milliLiter(s) Oral every 4 hours PRN Dyspepsia  dextrose Oral Gel 15 Gram(s) Oral once PRN Blood Glucose LESS THAN 70 milliGRAM(s)/deciliter  melatonin 3 milliGRAM(s) Oral at bedtime PRN Insomnia  ondansetron Injectable 4 milliGRAM(s) IV Push every 8 hours PRN Nausea and/or Vomiting

## 2023-10-27 NOTE — DISCHARGE NOTE PROVIDER - DETAILS OF MALNUTRITION DIAGNOSIS/DIAGNOSES
This patient has been assessed with a concern for Malnutrition and was treated during this hospitalization for the following Nutrition diagnosis/diagnoses:     -  10/27/2023: Morbid obesity (BMI > 40)

## 2023-10-27 NOTE — DISCHARGE NOTE NURSING/CASE MANAGEMENT/SOCIAL WORK - PATIENT PORTAL LINK FT
You can access the FollowMyHealth Patient Portal offered by [facility] by registering at the following website: [email or web address]. By joining [facility]’s FollowMyHealth portal, you will also be able to view your health information using other applications (apps) compatible with our system.

## 2023-10-27 NOTE — DISCHARGE NOTE PROVIDER - CARE PROVIDER_API CALL
Prince Fritz  Infectious Disease  [address] [place], [place] [address]  Phone: [phone]  Fax: [phone]  Follow Up Time:

## 2023-10-27 NOTE — DIETITIAN NUTRITION RISK NOTIFICATION - TREATMENT: THE FOLLOWING DIET HAS BEEN RECOMMENDED
Diet, DASH/TLC:   Sodium & Cholesterol Restricted  Consistent Carbohydrate {Evening Snack} (10-27-23 @ 10:26) [Pending Verification By Attending]  Diet, Regular (10-22-23 @ 01:21) [Active]

## 2023-10-27 NOTE — DIETITIAN INITIAL EVALUATION ADULT - PHYSCIAL ASSESSMENT
Pt is morbidly obese.  Pt c report of usual wt. of 326 LBS(148.2 kg), unaware of wt. loss.  As per adm wt. of 317.4 LBS(144 kg), wt. loss of ~ 9 LBS noted./well nourished

## 2023-10-27 NOTE — DIETITIAN INITIAL EVALUATION ADULT - ORAL INTAKE PTA/DIET HISTORY
Pt reports good appetite, did his own shopping/cooking.  Diet PTA: low sodium.  As per diet hx obtained, pt c inconsistent meal pattern, unclear on CHO foods containing CHO.  Pt without report of food allergies/intolerances/chewing/swallowing difficulty   Pt reports good appetite, did his own shopping/cooking.  Diet PTA: low sodium.  As per diet hx obtained, pt c inconsistent meal pattern, unclear on foods containing CHO.  Pt without report of food allergies/intolerances/chewing/swallowing difficulty

## 2023-10-27 NOTE — DISCHARGE NOTE PROVIDER - HOSPITAL COURSE
kristian Harding is a 48 year old male with PMHx of HTN, HLD, and NIDDM2 who presented to the ED on 10/21/23 for complaints of cough.  In the ED, Tmax 103.1, HR as elevated as 138, RR as elevated as 25. WBC 21.11K, blood glucose 316. Lactic acid 2.8. RVP negative.   U/A with ketonuria, glucosuria.   CT A/P There are several sclerotic bone lesions in the pelvis. Recommend correlation with PSA to rule out prostate cancer and blastic metastatic disease.  Blood cultures with group B Streptococcus   LLE cellulitis present on exam   likely source of bacteremia from cellulitis   patient was coughing but his CT chest as well as lung exam is clear   unclear sign of pulmonary infection but perhaps manifestation of sepsis   antibiotics would likely cover CAP     10/24: cellulitis with some improvement, site is not as tender on today's exam. Pt is afebrile since 10/22, RA no wbc, repeat BCs with no growth to date, TTE is pending. Ceftriaxone IV continued.   Attending addendum:  I have reviewed all pertinent clinical information and agree with the NP's note.   continue ceftriaxone   blood cultures now negative  awaiting Transthoracic Echocardiogram   ideal to treat with IV for duration of treatment   if patient does not want IV antibiotics, would treat with IV as long as able then transition to PO Levaquin 750mg q24hrs for an additional 2 weeks from discharge  follow up in infectious disease office after discharge at end of antibiotics course     10/25: remains afebrile, RA, no wbc, Cr ok, HIV screen negative, repeat BCs remain with no growth to date, TTE performed and pending reading, cellulitis is improving, Ceftriaxone continued.   Attending addendum:  I have reviewed all pertinent clinical information and agree with the NP's note.   continue antibiotics   follow up on Transthoracic Echocardiogram   if negative and patient amenable to IV antibiotics would treat for 2 weeks from negative blood cultures   good glycemic control  ensure up to date on vaccines such as influenza and TDAP and if not offer here     10/26: no fevers, RA, no wbc, repeat BCs remain with no growth to date, TTE - no vegetation, cellulitis with significant improvement, pt needs two weeks of IV abx from negative BCs, discussed with the pt, Rx provided. Discussed with the pt risks and benefits of a midline and prolonged course of abx, all question answered to the best of my ability.     Plan:  continue ceftriaxone 2g q24hrs   follow repeat blood cultures- NGTD  Transthoracic Echocardiogram - no vegetation   ok to place a midline  Uncontrolled DM with Hgb A1c 10.6, control his glucose levels and avoid hypoglycemic episodes   trend wbc   trend fever  follow up in infectious disease office after discharge at end of antibiotics course     when ready for discharge:  place a midline  continue ceftriaxone 2 g IV daily - last dose 11/4/2023 - Rx provided  one time lab work: cbc with diff, cmp - fax to Dr. Fritz at [phone]  follow up with ID in the office:

## 2023-10-27 NOTE — DISCHARGE NOTE PROVIDER - NSDCMRMEDTOKEN_GEN_ALL_CORE_FT
acarbose 100 mg oral tablet: 1 tab(s) orally 3 times a day  aspirin 81 mg oral tablet: 1 tab(s) orally once a day  cefTRIAXone 2 g injection: 2 gram(s) intravenously once a day Ceftriaxone 2 grams IV daily - last dose 11/4/2023  one time lab work: cbc with diff, cmp - fax to Dr. Fritz at [phone]  follow up with Id in the office  remove midline upon completion of the course of abx  flushes  glipiZIDE 10 mg oral tablet, extended release: 1 tab(s) orally once a day  lisinopril 40 mg oral tablet: 1 tab(s) orally once a day  metFORMIN 850 mg oral tablet: 1 tab(s) orally 2 times a day  NIFEdipine 60 mg oral tablet, extended release: 1 tab(s) orally once a day  simvastatin 40 mg oral tablet: 1 tab(s) orally once a day  tamsulosin 0.4 mg oral capsule: 1 cap(s) orally once a day (at bedtime)  Trulicity Pen 1.5 mg/0.5 mL subcutaneous solution: 1.5 milligram(s) subcutaneously once a week

## 2023-10-27 NOTE — DISCHARGE NOTE PROVIDER - NSDCCPCAREPLAN_GEN_ALL_CORE_FT
PRINCIPAL DISCHARGE DIAGNOSIS  Diagnosis: Sepsis due to cellulitis  Assessment and Plan of Treatment: please complete antibiotics      SECONDARY DISCHARGE DIAGNOSES  Diagnosis: HTN (hypertension)  Assessment and Plan of Treatment:     Diagnosis: Generalized weakness  Assessment and Plan of Treatment:     Diagnosis: HLD (hyperlipidemia)  Assessment and Plan of Treatment:     Diagnosis: Diabetes mellitus with hyperglycemia, without long-term current use of insulin  Assessment and Plan of Treatment:     Diagnosis: Cough  Assessment and Plan of Treatment:

## 2023-10-27 NOTE — DIETITIAN INITIAL EVALUATION ADULT - OTHER INFO
Pt c hx of DM for few years, was on acarbose, glipizide, metformin for DM.  Pt self monitored blood glucose 1 x day.  Per pt., last A1C=6.9% in May 2023.  Pt has not seen an Endocrinologist.   D/C plans noted.  Pt educated on heart healthy, consistent CHO intake, SMBG glucose goals, A1C goal<7.0%, encouraged gradual wt. loss.   Pt c hx of DM for few years, was on acarbose, glipizide, metformin for DM & Trulicity.  Pt self monitored blood glucose 1 x day.  Per pt., last A1C=6.9% in May 2023.  Pt has not seen an Endocrinologist.   D/C plans noted.  Pt educated on heart healthy, consistent CHO intake, SMBG glucose goals, A1C goal<7.0%, encouraged gradual wt. loss.

## 2023-10-27 NOTE — DIETITIAN INITIAL EVALUATION ADULT - LITERATURE/VIDEOS GIVEN
heart healthy consistent CHO nutrition therapy, portion plate method, blood glucose goals, sick day with diabetes, low blood glucose, tips to support wt. loss

## 2023-10-28 LAB
CULTURE RESULTS: SIGNIFICANT CHANGE UP
SPECIMEN SOURCE: SIGNIFICANT CHANGE UP

## 2023-11-01 DIAGNOSIS — Z79.84 LONG TERM (CURRENT) USE OF ORAL HYPOGLYCEMIC DRUGS: ICD-10-CM

## 2023-11-01 DIAGNOSIS — Z79.82 LONG TERM (CURRENT) USE OF ASPIRIN: ICD-10-CM

## 2023-11-01 DIAGNOSIS — E66.01 MORBID (SEVERE) OBESITY DUE TO EXCESS CALORIES: ICD-10-CM

## 2023-11-01 DIAGNOSIS — I10 ESSENTIAL (PRIMARY) HYPERTENSION: ICD-10-CM

## 2023-11-01 DIAGNOSIS — Z20.822 CONTACT WITH AND (SUSPECTED) EXPOSURE TO COVID-19: ICD-10-CM

## 2023-11-01 DIAGNOSIS — E78.5 HYPERLIPIDEMIA, UNSPECIFIED: ICD-10-CM

## 2023-11-01 DIAGNOSIS — E11.65 TYPE 2 DIABETES MELLITUS WITH HYPERGLYCEMIA: ICD-10-CM

## 2023-11-01 DIAGNOSIS — Z79.85 LONG-TERM (CURRENT) USE OF INJECTABLE NON-INSULIN ANTIDIABETIC DRUGS: ICD-10-CM

## 2023-11-01 DIAGNOSIS — A40.1 SEPSIS DUE TO STREPTOCOCCUS, GROUP B: ICD-10-CM

## 2023-11-01 DIAGNOSIS — L03.116 CELLULITIS OF LEFT LOWER LIMB: ICD-10-CM

## 2023-11-01 DIAGNOSIS — E87.20 ACIDOSIS, UNSPECIFIED: ICD-10-CM

## 2023-11-01 DIAGNOSIS — R05.9 COUGH, UNSPECIFIED: ICD-10-CM

## 2023-11-01 DIAGNOSIS — A41.9 SEPSIS, UNSPECIFIED ORGANISM: ICD-10-CM

## 2023-12-16 ENCOUNTER — EMERGENCY (EMERGENCY)
Facility: HOSPITAL | Age: 48
LOS: 0 days | Discharge: HOME | End: 2023-12-16
Attending: STUDENT IN AN ORGANIZED HEALTH CARE EDUCATION/TRAINING PROGRAM
Payer: COMMERCIAL

## 2023-12-16 VITALS
RESPIRATION RATE: 16 BRPM | OXYGEN SATURATION: 98 % | TEMPERATURE: 97 F | DIASTOLIC BLOOD PRESSURE: 93 MMHG | WEIGHT: 315 LBS | HEART RATE: 82 BPM | HEIGHT: 73 IN | SYSTOLIC BLOOD PRESSURE: 172 MMHG

## 2023-12-16 VITALS
HEART RATE: 90 BPM | DIASTOLIC BLOOD PRESSURE: 80 MMHG | TEMPERATURE: 98 F | OXYGEN SATURATION: 98 % | SYSTOLIC BLOOD PRESSURE: 162 MMHG | RESPIRATION RATE: 16 BRPM

## 2023-12-16 DIAGNOSIS — R11.2 NAUSEA WITH VOMITING, UNSPECIFIED: ICD-10-CM

## 2023-12-16 DIAGNOSIS — R10.33 PERIUMBILICAL PAIN: ICD-10-CM

## 2023-12-16 DIAGNOSIS — I10 ESSENTIAL (PRIMARY) HYPERTENSION: ICD-10-CM

## 2023-12-16 DIAGNOSIS — Z98.890 OTHER SPECIFIED POSTPROCEDURAL STATES: ICD-10-CM

## 2023-12-16 DIAGNOSIS — R14.0 ABDOMINAL DISTENSION (GASEOUS): ICD-10-CM

## 2023-12-16 DIAGNOSIS — R11.10 VOMITING, UNSPECIFIED: ICD-10-CM

## 2023-12-16 DIAGNOSIS — E78.5 HYPERLIPIDEMIA, UNSPECIFIED: ICD-10-CM

## 2023-12-16 DIAGNOSIS — E11.9 TYPE 2 DIABETES MELLITUS WITHOUT COMPLICATIONS: ICD-10-CM

## 2023-12-16 DIAGNOSIS — Z98.890 OTHER SPECIFIED POSTPROCEDURAL STATES: Chronic | ICD-10-CM

## 2023-12-16 PROBLEM — E78.00 PURE HYPERCHOLESTEROLEMIA, UNSPECIFIED: Chronic | Status: ACTIVE | Noted: 2023-10-21

## 2023-12-16 LAB
ALBUMIN SERPL ELPH-MCNC: 3.1 G/DL — LOW (ref 3.3–5)
ALBUMIN SERPL ELPH-MCNC: 3.1 G/DL — LOW (ref 3.3–5)
ALP SERPL-CCNC: 64 U/L — SIGNIFICANT CHANGE UP (ref 40–120)
ALP SERPL-CCNC: 64 U/L — SIGNIFICANT CHANGE UP (ref 40–120)
ALT FLD-CCNC: 24 U/L — SIGNIFICANT CHANGE UP (ref 12–78)
ALT FLD-CCNC: 24 U/L — SIGNIFICANT CHANGE UP (ref 12–78)
ANION GAP SERPL CALC-SCNC: 3 MMOL/L — LOW (ref 5–17)
ANION GAP SERPL CALC-SCNC: 3 MMOL/L — LOW (ref 5–17)
APTT BLD: 30.1 SEC — SIGNIFICANT CHANGE UP (ref 24.5–35.6)
APTT BLD: 30.1 SEC — SIGNIFICANT CHANGE UP (ref 24.5–35.6)
AST SERPL-CCNC: 32 U/L — SIGNIFICANT CHANGE UP (ref 15–37)
AST SERPL-CCNC: 32 U/L — SIGNIFICANT CHANGE UP (ref 15–37)
BASOPHILS # BLD AUTO: 0.02 K/UL — SIGNIFICANT CHANGE UP (ref 0–0.2)
BASOPHILS # BLD AUTO: 0.02 K/UL — SIGNIFICANT CHANGE UP (ref 0–0.2)
BASOPHILS NFR BLD AUTO: 0.4 % — SIGNIFICANT CHANGE UP (ref 0–2)
BASOPHILS NFR BLD AUTO: 0.4 % — SIGNIFICANT CHANGE UP (ref 0–2)
BILIRUB SERPL-MCNC: 0.3 MG/DL — SIGNIFICANT CHANGE UP (ref 0.2–1.2)
BILIRUB SERPL-MCNC: 0.3 MG/DL — SIGNIFICANT CHANGE UP (ref 0.2–1.2)
BLD GP AB SCN SERPL QL: SIGNIFICANT CHANGE UP
BLD GP AB SCN SERPL QL: SIGNIFICANT CHANGE UP
BUN SERPL-MCNC: 10 MG/DL — SIGNIFICANT CHANGE UP (ref 7–23)
BUN SERPL-MCNC: 10 MG/DL — SIGNIFICANT CHANGE UP (ref 7–23)
CALCIUM SERPL-MCNC: 9 MG/DL — SIGNIFICANT CHANGE UP (ref 8.5–10.1)
CALCIUM SERPL-MCNC: 9 MG/DL — SIGNIFICANT CHANGE UP (ref 8.5–10.1)
CHLORIDE SERPL-SCNC: 111 MMOL/L — HIGH (ref 96–108)
CHLORIDE SERPL-SCNC: 111 MMOL/L — HIGH (ref 96–108)
CO2 SERPL-SCNC: 29 MMOL/L — SIGNIFICANT CHANGE UP (ref 22–31)
CO2 SERPL-SCNC: 29 MMOL/L — SIGNIFICANT CHANGE UP (ref 22–31)
CREAT SERPL-MCNC: 0.86 MG/DL — SIGNIFICANT CHANGE UP (ref 0.5–1.3)
CREAT SERPL-MCNC: 0.86 MG/DL — SIGNIFICANT CHANGE UP (ref 0.5–1.3)
EGFR: 105 ML/MIN/1.73M2 — SIGNIFICANT CHANGE UP
EGFR: 105 ML/MIN/1.73M2 — SIGNIFICANT CHANGE UP
EOSINOPHIL # BLD AUTO: 0.02 K/UL — SIGNIFICANT CHANGE UP (ref 0–0.5)
EOSINOPHIL # BLD AUTO: 0.02 K/UL — SIGNIFICANT CHANGE UP (ref 0–0.5)
EOSINOPHIL NFR BLD AUTO: 0.4 % — SIGNIFICANT CHANGE UP (ref 0–6)
EOSINOPHIL NFR BLD AUTO: 0.4 % — SIGNIFICANT CHANGE UP (ref 0–6)
GLUCOSE SERPL-MCNC: 218 MG/DL — HIGH (ref 70–99)
GLUCOSE SERPL-MCNC: 218 MG/DL — HIGH (ref 70–99)
HCT VFR BLD CALC: 43 % — SIGNIFICANT CHANGE UP (ref 39–50)
HCT VFR BLD CALC: 43 % — SIGNIFICANT CHANGE UP (ref 39–50)
HGB BLD-MCNC: 13.7 G/DL — SIGNIFICANT CHANGE UP (ref 13–17)
HGB BLD-MCNC: 13.7 G/DL — SIGNIFICANT CHANGE UP (ref 13–17)
IMM GRANULOCYTES NFR BLD AUTO: 0.4 % — SIGNIFICANT CHANGE UP (ref 0–0.9)
IMM GRANULOCYTES NFR BLD AUTO: 0.4 % — SIGNIFICANT CHANGE UP (ref 0–0.9)
INR BLD: 0.99 RATIO — SIGNIFICANT CHANGE UP (ref 0.85–1.18)
INR BLD: 0.99 RATIO — SIGNIFICANT CHANGE UP (ref 0.85–1.18)
LYMPHOCYTES # BLD AUTO: 0.71 K/UL — LOW (ref 1–3.3)
LYMPHOCYTES # BLD AUTO: 0.71 K/UL — LOW (ref 1–3.3)
LYMPHOCYTES # BLD AUTO: 13.3 % — SIGNIFICANT CHANGE UP (ref 13–44)
LYMPHOCYTES # BLD AUTO: 13.3 % — SIGNIFICANT CHANGE UP (ref 13–44)
MAGNESIUM SERPL-MCNC: 2 MG/DL — SIGNIFICANT CHANGE UP (ref 1.6–2.6)
MAGNESIUM SERPL-MCNC: 2 MG/DL — SIGNIFICANT CHANGE UP (ref 1.6–2.6)
MCHC RBC-ENTMCNC: 26.9 PG — LOW (ref 27–34)
MCHC RBC-ENTMCNC: 26.9 PG — LOW (ref 27–34)
MCHC RBC-ENTMCNC: 31.9 G/DL — LOW (ref 32–36)
MCHC RBC-ENTMCNC: 31.9 G/DL — LOW (ref 32–36)
MCV RBC AUTO: 84.5 FL — SIGNIFICANT CHANGE UP (ref 80–100)
MCV RBC AUTO: 84.5 FL — SIGNIFICANT CHANGE UP (ref 80–100)
MONOCYTES # BLD AUTO: 0.24 K/UL — SIGNIFICANT CHANGE UP (ref 0–0.9)
MONOCYTES # BLD AUTO: 0.24 K/UL — SIGNIFICANT CHANGE UP (ref 0–0.9)
MONOCYTES NFR BLD AUTO: 4.5 % — SIGNIFICANT CHANGE UP (ref 2–14)
MONOCYTES NFR BLD AUTO: 4.5 % — SIGNIFICANT CHANGE UP (ref 2–14)
NEUTROPHILS # BLD AUTO: 4.33 K/UL — SIGNIFICANT CHANGE UP (ref 1.8–7.4)
NEUTROPHILS # BLD AUTO: 4.33 K/UL — SIGNIFICANT CHANGE UP (ref 1.8–7.4)
NEUTROPHILS NFR BLD AUTO: 81 % — HIGH (ref 43–77)
NEUTROPHILS NFR BLD AUTO: 81 % — HIGH (ref 43–77)
NRBC # BLD: 0 /100 WBCS — SIGNIFICANT CHANGE UP (ref 0–0)
NRBC # BLD: 0 /100 WBCS — SIGNIFICANT CHANGE UP (ref 0–0)
PLATELET # BLD AUTO: 249 K/UL — SIGNIFICANT CHANGE UP (ref 150–400)
PLATELET # BLD AUTO: 249 K/UL — SIGNIFICANT CHANGE UP (ref 150–400)
POTASSIUM SERPL-MCNC: 5.2 MMOL/L — SIGNIFICANT CHANGE UP (ref 3.5–5.3)
POTASSIUM SERPL-MCNC: 5.2 MMOL/L — SIGNIFICANT CHANGE UP (ref 3.5–5.3)
POTASSIUM SERPL-SCNC: 5.2 MMOL/L — SIGNIFICANT CHANGE UP (ref 3.5–5.3)
POTASSIUM SERPL-SCNC: 5.2 MMOL/L — SIGNIFICANT CHANGE UP (ref 3.5–5.3)
PROT SERPL-MCNC: 7.7 GM/DL — SIGNIFICANT CHANGE UP (ref 6–8.3)
PROT SERPL-MCNC: 7.7 GM/DL — SIGNIFICANT CHANGE UP (ref 6–8.3)
PROTHROM AB SERPL-ACNC: 11.8 SEC — SIGNIFICANT CHANGE UP (ref 9.5–13)
PROTHROM AB SERPL-ACNC: 11.8 SEC — SIGNIFICANT CHANGE UP (ref 9.5–13)
RBC # BLD: 5.09 M/UL — SIGNIFICANT CHANGE UP (ref 4.2–5.8)
RBC # BLD: 5.09 M/UL — SIGNIFICANT CHANGE UP (ref 4.2–5.8)
RBC # FLD: 14.1 % — SIGNIFICANT CHANGE UP (ref 10.3–14.5)
RBC # FLD: 14.1 % — SIGNIFICANT CHANGE UP (ref 10.3–14.5)
SODIUM SERPL-SCNC: 143 MMOL/L — SIGNIFICANT CHANGE UP (ref 135–145)
SODIUM SERPL-SCNC: 143 MMOL/L — SIGNIFICANT CHANGE UP (ref 135–145)
WBC # BLD: 5.34 K/UL — SIGNIFICANT CHANGE UP (ref 3.8–10.5)
WBC # BLD: 5.34 K/UL — SIGNIFICANT CHANGE UP (ref 3.8–10.5)
WBC # FLD AUTO: 5.34 K/UL — SIGNIFICANT CHANGE UP (ref 3.8–10.5)
WBC # FLD AUTO: 5.34 K/UL — SIGNIFICANT CHANGE UP (ref 3.8–10.5)

## 2023-12-16 PROCEDURE — 74177 CT ABD & PELVIS W/CONTRAST: CPT | Mod: 26,MA

## 2023-12-16 PROCEDURE — 99285 EMERGENCY DEPT VISIT HI MDM: CPT

## 2023-12-16 PROCEDURE — 93010 ELECTROCARDIOGRAM REPORT: CPT

## 2023-12-16 PROCEDURE — 93971 EXTREMITY STUDY: CPT | Mod: 26,LT

## 2023-12-16 RX ORDER — ACETAMINOPHEN 500 MG
1000 TABLET ORAL ONCE
Refills: 0 | Status: COMPLETED | OUTPATIENT
Start: 2023-12-16 | End: 2023-12-16

## 2023-12-16 RX ORDER — ONDANSETRON 8 MG/1
4 TABLET, FILM COATED ORAL ONCE
Refills: 0 | Status: COMPLETED | OUTPATIENT
Start: 2023-12-16 | End: 2023-12-16

## 2023-12-16 RX ORDER — SODIUM CHLORIDE 9 MG/ML
1000 INJECTION INTRAMUSCULAR; INTRAVENOUS; SUBCUTANEOUS ONCE
Refills: 0 | Status: COMPLETED | OUTPATIENT
Start: 2023-12-16 | End: 2023-12-16

## 2023-12-16 RX ADMIN — SODIUM CHLORIDE 1000 MILLILITER(S): 9 INJECTION INTRAMUSCULAR; INTRAVENOUS; SUBCUTANEOUS at 13:03

## 2023-12-16 RX ADMIN — Medication 400 MILLIGRAM(S): at 13:03

## 2023-12-16 NOTE — ED ADULT NURSE NOTE - NSFALLUNIVINTERV_ED_ALL_ED
Bed/Stretcher in lowest position, wheels locked, appropriate side rails in place/Call bell, personal items and telephone in reach/Instruct patient to call for assistance before getting out of bed/chair/stretcher/Non-slip footwear applied when patient is off stretcher/[place] to call system/Physically safe environment - no spills, clutter or unnecessary equipment/Purposeful proactive rounding/Room/bathroom lighting operational, light cord in reach Bed/Stretcher in lowest position, wheels locked, appropriate side rails in place/Call bell, personal items and telephone in reach/Instruct patient to call for assistance before getting out of bed/chair/stretcher/Non-slip footwear applied when patient is off stretcher/[place] to call system/Physically safe environment - no spills, clutter or unnecessary equipment/Purposeful proactive rounding/Room/bathroom lighting operational, light cord in reach

## 2023-12-16 NOTE — ED ADULT NURSE NOTE - OBJECTIVE STATEMENT
as per patient " on and off mid abdominal pain traveling to the left of abdomen x 1 year, possible abdominal hernia. C/o of nausea and vomiting worsen today"

## 2023-12-16 NOTE — ED PROVIDER NOTE - PROVIDER TOKENS
PROVIDER:[TOKEN:[[id number]]],PROVIDER:[TOKEN:[[id number]]] PROVIDER:[TOKEN:[[id number]]],PROVIDER:[TOKEN:[[id number]]]

## 2023-12-16 NOTE — ED PROVIDER NOTE - OBJECTIVE STATEMENT
48-year-old male past medical history of hypertension hyperlipidemia diabetes and umbilical hernia repair (1999) who presents with periumbilical abdominal pain, multiple episodes of yellowish nonbloody emesis, with localized pain above the bone.  Patient reports onset of symptoms for 1 day.  He reports last bowel movement was last evening, he is not sure if he has passed flatus this morning.  No fevers or chest pain or shortness of breath.

## 2023-12-16 NOTE — ED PROVIDER NOTE - INTERPRETATION
Home Care is calling regarding an established patient with [facility].    Requesting orders from: Aline Rivera  normal sinus rhythm

## 2023-12-16 NOTE — ED PROVIDER NOTE - PATIENT PORTAL LINK FT
You can access the FollowMyHealth Patient Portal offered by [facility] by registering at the following website: [email or web address]. By joining [facility]’s FollowMyHealth portal, you will also be able to view your health information using other applications (apps) compatible with our system. You can access the FollowMyHealth Patient Portal offered by [facility] by registering at the following website: [email or web address]. By joining "[employer]’s FollowMyHealth portal, you will also be able to view your health information using other applications (apps) compatible with our system.

## 2023-12-16 NOTE — ED PROVIDER NOTE - CARE PROVIDER_API CALL
Amadeo Becker  Surgery  [address]  [place] [address]  Phone: [phone]  Fax: [phone]  Follow Up Time:     Lupillo Chicas  Gastroenterology  [address], [address]  [place] [address]  Phone: [phone]  Fax: [phone]  Follow Up Time:    Amadeo Becker  Surgery  [address]  [place] [address]  Phone: [phone]  Fax: [phone]  Follow Up Time:     Luplilo Chicas  Gastroenterology  [address], [address]  [place] [address]  Phone: [phone]  Fax: [phone]  Follow Up Time:

## 2023-12-16 NOTE — ED PROVIDER NOTE - CLINICAL SUMMARY MEDICAL DECISION MAKING FREE TEXT BOX
48-year-old male past medical history of hypertension hyperlipidemia diabetes and umbilical hernia repair (1999) who presents with periumbilical abdominal pain, multiple episodes of yellowish nonbloody emesis, with localized pain above the bone.  Patient reports onset of symptoms for 1 day.  He reports last bowel movement was last evening, he is not sure if he has passed flatus this morning.  No fevers or chest pain or shortness of breath.  - eval for intestinal obstruction vs incarcerated hernia, labs, analgesia, anti-emetics, IV fluids, ct a/p

## 2023-12-16 NOTE — ED PROVIDER NOTE - PHYSICAL EXAMINATION
Gen: aox3, nad,   Head: NCAT  ENT: Airway patent, moist mucous membranes, nasal passageways clear   Cardiac: Normal rate, normal rhythm   Respiratory: Lungs CTA B/L  Gastrointestinal: Abdomen soft,  + mod distended, + focal ttp above umbilicus, no clear hernia appreciated, no rebound, no guarding  MSK: No gross abnormalities, FROM of all four extremities, mild LLE edema per pt is chronic   HEME: Extremities warm and well perfused   Skin: No rashes, no lesions  Neuro: No gross neurologic deficits

## 2023-12-16 NOTE — ED PROVIDER NOTE - INPATIENT RECORD SUMMARY
admission notes from 10/2023 reviewed - pt had LLE cellulitis and bacteremia, tx'd with cephalosporin

## 2024-06-17 NOTE — PATIENT PROFILE ADULT - NSPRESCRALCFREQ_GEN_A_NUR
How to Take Your Medication Before Surgery  Preoperative Medication Instructions   Take all scheduled medications on the day of surgery       Patient Education   Before Your Child s Surgery or Sedated Procedure    Please call the doctor if there s any change in your child s health, including signs of a cold or flu (sore throat, runny nose, cough, rash or fever). If your child is having surgery, call the surgeon s office. If your child is having another procedure, call your family doctor.  Do not give over-the-counter medicine within 24 hours of the surgery or procedure (unless the doctor tells you to).  If your child takes prescribed drugs: Ask the doctor which medicines are safe to take before the surgery or procedure.  Follow the care team s instructions for eating and drinking before surgery or procedure.   Have your child take a shower or bath the night before surgery, cleaning their skin gently. Use the soap the surgeon gave you. If you were not given special soap, use your regular soap. Do not shave or scrub the surgery site.  Have your child wear clean pajamas and use clean sheets on their bed.     
Never